# Patient Record
Sex: FEMALE | Race: WHITE | Employment: OTHER | RURAL
[De-identification: names, ages, dates, MRNs, and addresses within clinical notes are randomized per-mention and may not be internally consistent; named-entity substitution may affect disease eponyms.]

---

## 2017-02-06 ENCOUNTER — OFFICE VISIT (OUTPATIENT)
Dept: NEUROLOGY | Age: 82
End: 2017-02-06

## 2017-02-06 VITALS
HEART RATE: 83 BPM | HEIGHT: 62 IN | DIASTOLIC BLOOD PRESSURE: 80 MMHG | WEIGHT: 172 LBS | OXYGEN SATURATION: 97 % | SYSTOLIC BLOOD PRESSURE: 167 MMHG | BODY MASS INDEX: 31.65 KG/M2

## 2017-02-06 DIAGNOSIS — H40.1130 PRIMARY OPEN ANGLE GLAUCOMA OF BOTH EYES, UNSPECIFIED GLAUCOMA STAGE: ICD-10-CM

## 2017-02-06 DIAGNOSIS — R79.89 INCREASED HOMOCYSTEINE: ICD-10-CM

## 2017-02-06 DIAGNOSIS — E03.9 ACQUIRED HYPOTHYROIDISM: ICD-10-CM

## 2017-02-06 DIAGNOSIS — I63.20 CEREBROVASCULAR ACCIDENT (CVA) DUE TO STENOSIS OF PRECEREBRAL ARTERY (HCC): ICD-10-CM

## 2017-02-06 DIAGNOSIS — E78.01 FAMILIAL HYPERCHOLESTEROLEMIA: ICD-10-CM

## 2017-02-06 DIAGNOSIS — I65.23 BILATERAL CAROTID ARTERY STENOSIS: ICD-10-CM

## 2017-02-06 DIAGNOSIS — I10 ESSENTIAL HYPERTENSION: ICD-10-CM

## 2017-02-06 DIAGNOSIS — E53.8 B12 DEFICIENCY: ICD-10-CM

## 2017-02-06 DIAGNOSIS — H49.01: Primary | ICD-10-CM

## 2017-02-06 DIAGNOSIS — E11.41 CONTROLLED TYPE 2 DIABETES MELLITUS WITH DIABETIC MONONEUROPATHY, WITHOUT LONG-TERM CURRENT USE OF INSULIN (HCC): ICD-10-CM

## 2017-02-06 RX ORDER — LOSARTAN POTASSIUM 50 MG/1
TABLET ORAL DAILY
COMMUNITY

## 2017-02-06 RX ORDER — ASPIRIN 81 MG/1
TABLET ORAL DAILY
COMMUNITY

## 2017-02-06 RX ORDER — SERTRALINE HYDROCHLORIDE 50 MG/1
TABLET, FILM COATED ORAL DAILY
COMMUNITY

## 2017-02-06 RX ORDER — NYSTATIN 100000 [USP'U]/G
POWDER TOPICAL AS NEEDED
COMMUNITY

## 2017-02-06 RX ORDER — GLUCOSAMINE SULFATE 1500 MG
POWDER IN PACKET (EA) ORAL DAILY
COMMUNITY

## 2017-02-06 RX ORDER — OXYCODONE AND ACETAMINOPHEN 5; 325 MG/1; MG/1
1 TABLET ORAL
COMMUNITY

## 2017-02-06 RX ORDER — TIMOLOL MALEATE 5 MG/ML
1 SOLUTION/ DROPS OPHTHALMIC AS NEEDED
COMMUNITY

## 2017-02-06 RX ORDER — DIPHENHYDRAMINE HCL 25 MG
25 TABLET ORAL AS NEEDED
COMMUNITY

## 2017-02-06 NOTE — PATIENT INSTRUCTIONS
[address] [facility]   Statement to Patients  April 1, 2014      In an effort to ensure the large volume of patient prescription refills is processed in the most efficient and expeditious manner, we are asking our patients to assist us by calling your Pharmacy for all prescription refills, this will include also your  Mail Order Pharmacy. The pharmacy will contact our office electronically to continue the refill process. Please do not wait until the last minute to call your pharmacy. We need at least 48 hours (2days) to fill prescriptions. We also encourage you to call your pharmacy before going to  your prescription to make sure it is ready. With regard to controlled substance prescription refill requests (narcotic refills) that need to be picked up at our office, we ask your cooperation by providing us with at least 72 hours (3days) notice that you will need a refill. We will not refill narcotic prescription refill requests after 4:00pm on any weekday, Monday through Thursday, or after 2:00pm on Fridays, or on the weekends. We encourage everyone to explore another way of getting your prescription refill request processed using [employer], our patient web portal through our electronic medical record system. [employer] is an efficient and effective way to communicate your medication request directly to the office and  downloadable as an allan on your smart phone . [employer] also features a review functionality that allows you to view your medication list as well as leave messages for your physician. Are you ready to get connected? If so please review the attatched instructions or speak to any of our staff to get you set up right away! Thank you so much for your cooperation. Should you have any questions please contact our Practice Administrator.     The Physicians and Staff,  [facility]

## 2017-02-07 DIAGNOSIS — E11.41 CONTROLLED TYPE 2 DIABETES MELLITUS WITH DIABETIC MONONEUROPATHY, WITHOUT LONG-TERM CURRENT USE OF INSULIN (HCC): ICD-10-CM

## 2017-02-07 DIAGNOSIS — E03.9 ACQUIRED HYPOTHYROIDISM: ICD-10-CM

## 2017-02-12 NOTE — PROGRESS NOTES
NEUROLOGY HISTORY AND PHYSICAL    Name Gideon Miguel Age [de-identified] y.o. MRN [id number]  1935     Ashley Mejia is a [de-identified] y.o. female. HPI Comments: This is a [de-identified] y.o. right handed female with a medical vision of hypertension, hyperlipidemia and galucoma. She has been seeing double. It is binocular. It is a skewed vision. It is worse when looking far. She has not noted what makes it worse. She does not recall the onset. She is able to read small print and magazine font. There is no variation of magnitude during the day or night. She does not suffer headache. Allergies:  None    Current Outpatient Prescriptions:  losartan (COZAAR) 50 mg tablet, Take  by mouth daily. sertraline (ZOLOFT) 50 mg tablet, Take  by mouth daily. timolol (TIMOPTIC) 0.5 % ophthalmic solution, 1 Drop as needed. nystatin (MYCOSTATIN) powder, Apply  to affected area as needed. oxyCODONE-acetaminophen (PERCOCET) 5-325 mg per tablet, Take 1 Tab by mouth every four (4) hours as needed for Pain. aspirin delayed-release 81 mg tablet, Take  by mouth daily. cholecalciferol (VITAMIN D3) 1,000 unit cap, Take  by mouth daily. diphenhydrAMINE (BENADRYL) 25 mg tablet, Take 25 mg by mouth as needed for Sleep. No current facility-administered medications for this visit. Past Medical History:    Arthritis                                                     Diabetes ([address]                                                Hypertension                                                  Social History    Marital status:                   Social History Main Topics    Smoking status: Never Smoker                                                               Review of patient's family history indicates:    Parkinson's Disease            Neg Hx                    Seizures                       Neg Hx                    Huntingtons disease            Neg Hx                        Vision Change    The history is provided by the patient.  This is a new (diplopia) problem. Episode onset: More than a month ago. The problem occurs constantly. The problem has not changed since onset. The right eye is affected. The injury mechanism was none. There is no history of trauma to the eye. There is no known exposure to pink eye. She does not wear contacts. Associated symptoms include double vision and dizziness. Pertinent negatives include no blurred vision, no discharge, no photophobia, no eye redness, no tingling and no pain. Review of Systems   Constitutional: Positive for malaise/fatigue. HENT: Negative. Eyes: Positive for double vision and visual disturbance. Negative for blurred vision, photophobia, pain, discharge and redness. Respiratory: Negative. Cardiovascular: Negative. Gastrointestinal: Negative. Genitourinary: Negative. Musculoskeletal: Positive for back pain, joint pain and myalgias. Negative for falls and neck pain. Skin: Negative. Neurological: Positive for dizziness. Negative for tingling, tremors, sensory change and speech change. Endo/Heme/Allergies: Negative. Psychiatric/Behavioral: Negative for depression, hallucinations, memory loss, substance abuse and suicidal ideas. The patient is nervous/anxious and has insomnia. Physical Exam   Constitutional: She is oriented to person, place, and time. She appears well-developed. HENT:   Head: Normocephalic and atraumatic. Eyes: Conjunctivae are normal. Pupils are equal, round, and reactive to light. Difficult exam. Diplopia in most all directions except when looking down and to some degree when looking to the left. 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 Abdominal: Soft. Bowel sounds are normal.   Neurological: She is alert and oriented to person, place, and time. She has normal reflexes. She displays normal reflexes. She exhibits normal muscle tone. Gait abnormal. Coordination normal.   Skin: Skin is warm. No rash noted. No erythema. No pallor. Psychiatric: Her behavior is normal. Judgment and thought content normal. Her mood appears anxious. Cognition and memory are not impaired. Neurological Exam:  Mental Status:  alert, and oriented person, place and time   Speech: Fluent, no aphasia and no dysarthria   Cranial Nerves:   Intact visual fields. Facial sensation is normal. Facial movement is symmetric. Palate is midline. Normal sternocleidomastoid strength. Tongue is midline. Hearing is intact bilaterally. Eyes: PERRL, EOM's full, no nystagmus, no ptosis. Motor:  Full and symmetric strength and normal bulk and tone   Reflexes:   +2 and symmetric   Sensory:   symmetric, with distal sensory loss, to all modalities and poor  Proprioception and vibration distally   Gait:  Gait is llimping using a walker because of a bad knee   Tremor:   No tremor. Cerebellar:  Finger to nose was demonstrated competently. Neurovascular: no carotid bruits. No JVD     ASSESSMENT and PLAN       1. Cranial nerve III palsy, partial, right Differerntial includes myasthenia   2. Familial hypercholesterolemia LIPID PANEL   3. Controlled type 2 diabetes mellitus with diabetic mononeuropathy, without long-term current use of insulin (HCC) HEMOGLOBIN A1C WITH EAG       4. Essential hypertension    5. Primary open angle glaucoma of both eyes, unspecified glaucoma stage        6. B12 deficiency VITAMIN B12 & FOLATE   7. Increased homocysteine (HCC) HOMOCYST(E)INE, PLASMA   8. Cerebrovascular accident (CVA) due to stenosis of precerebral artery (HCC) MRI BRAIN WO CONT    2D ECHO COMPLETE ADULT (TTE) W OR WO CONTR   9. Acquired hypothyroidism TSH 3RD GENERATION   10.  Bilateral carotid artery stenosis DUPLEX CAROTID BILATERAL         60  minutes spent more than 50% spent in face to face  examination and discussion of treatment options and approach

## 2017-02-27 ENCOUNTER — TELEPHONE (OUTPATIENT)
Dept: NEUROLOGY | Age: 82
End: 2017-02-27

## 2017-02-27 NOTE — TELEPHONE ENCOUNTER
Patient's son Ananth Blackwood called to ask if labs ordered for patient required fasting. I Spoke with Linda Lyon who said yes as Lipid Panel and A1C were included in the labs. Frank Garay

## 2017-04-03 ENCOUNTER — OFFICE VISIT (OUTPATIENT)
Dept: NEUROLOGY | Age: 82
End: 2017-04-03

## 2017-04-03 VITALS
SYSTOLIC BLOOD PRESSURE: 110 MMHG | WEIGHT: 172 LBS | OXYGEN SATURATION: 98 % | DIASTOLIC BLOOD PRESSURE: 80 MMHG | HEIGHT: 62 IN | BODY MASS INDEX: 31.65 KG/M2 | HEART RATE: 91 BPM

## 2017-04-10 ENCOUNTER — TELEPHONE (OUTPATIENT)
Dept: NEUROLOGY | Age: 82
End: 2017-04-10

## 2017-04-10 DIAGNOSIS — H49.01 CRANIAL NERVE III PALSY, RIGHT: Primary | ICD-10-CM

## 2017-04-10 NOTE — TELEPHONE ENCOUNTER
Pt needs an order for an mri with and with out contrast due to her history of cancer. Unless the doctor doesn't want it that way. You can fax new order to [facility] [phone] with a lab order as well to check her creatin level. Please call.  Thanks

## 2017-04-11 NOTE — TELEPHONE ENCOUNTER
Son called to have the mri scheduled for patient at [facility] and they stated that they need an order for MRI with and without contrast due to the history of cancer. And also an order for labs to check her creatin level.

## 2017-04-13 DIAGNOSIS — N19 RENAL FAILURE: Primary | ICD-10-CM

## 2017-05-09 DIAGNOSIS — I63.20 CEREBROVASCULAR ACCIDENT (CVA) DUE TO STENOSIS OF PRECEREBRAL ARTERY (HCC): ICD-10-CM

## 2017-06-05 ENCOUNTER — TELEPHONE (OUTPATIENT)
Dept: NEUROLOGY | Age: 82
End: 2017-06-05

## 2018-12-07 ENCOUNTER — APPOINTMENT (OUTPATIENT)
Age: 83
Setting detail: DERMATOLOGY
End: 2018-12-10

## 2018-12-07 DIAGNOSIS — B37.2 CANDIDIASIS OF SKIN AND NAIL: ICD-10-CM

## 2018-12-07 DIAGNOSIS — Z71.89 OTHER SPECIFIED COUNSELING: ICD-10-CM

## 2018-12-07 DIAGNOSIS — L81.4 OTHER MELANIN HYPERPIGMENTATION: ICD-10-CM

## 2018-12-07 PROCEDURE — OTHER MIPS QUALITY: OTHER

## 2018-12-07 PROCEDURE — OTHER REASSURANCE: OTHER

## 2018-12-07 PROCEDURE — OTHER RECOMMENDATIONS: OTHER

## 2018-12-07 PROCEDURE — OTHER DIAGNOSIS COMMENT: OTHER

## 2018-12-07 PROCEDURE — OTHER COUNSELING: OTHER

## 2018-12-07 PROCEDURE — OTHER PRESCRIPTION: OTHER

## 2018-12-07 PROCEDURE — 99203 OFFICE O/P NEW LOW 30 MIN: CPT

## 2018-12-07 RX ORDER — CLOTRIMAZOLE AND BETAMETHASONE DIPROPIONATE 10; .5 MG/G; MG/G
CREAM TOPICAL
Qty: 1 | Refills: 2 | Status: ERX | COMMUNITY
Start: 2018-12-07

## 2018-12-07 RX ORDER — FLUCONAZOLE 200 MG/1
TABLET ORAL
Qty: 10 | Refills: 1 | Status: ERX | COMMUNITY
Start: 2018-12-07

## 2018-12-07 ASSESSMENT — LOCATION SIMPLE DESCRIPTION DERM
LOCATION SIMPLE: RIGHT BREAST
LOCATION SIMPLE: RIGHT WRIST
LOCATION SIMPLE: LEFT WRIST
LOCATION SIMPLE: ABDOMEN

## 2018-12-07 ASSESSMENT — LOCATION DETAILED DESCRIPTION DERM
LOCATION DETAILED: RIGHT DORSAL WRIST
LOCATION DETAILED: LEFT RIB CAGE
LOCATION DETAILED: RIGHT INFRAMAMMARY CREASE (INNER QUADRANT)
LOCATION DETAILED: LEFT DORSAL WRIST

## 2018-12-07 ASSESSMENT — LOCATION ZONE DERM
LOCATION ZONE: ARM
LOCATION ZONE: TRUNK

## 2018-12-07 NOTE — PROCEDURE: MIPS QUALITY
Name And Contact Information For Health Care Proxy: Bhavesh Ma [phone] Name And Contact Information For Health Care Proxy: Bhavesh Ma [phone]

## 2018-12-07 NOTE — PROCEDURE: RECOMMENDATIONS
Recommendations (Free Text): Zeasorb AF powder twice daily
Detail Level: Zone
Recommendation Preamble: The following recommendations were made:
Detail Level: Detailed

## 2019-12-23 ENCOUNTER — APPOINTMENT (OUTPATIENT)
Age: 84
Setting detail: DERMATOLOGY
End: 2020-01-02

## 2019-12-23 DIAGNOSIS — L23.9 ALLERGIC CONTACT DERMATITIS, UNSPECIFIED CAUSE: ICD-10-CM

## 2019-12-23 DIAGNOSIS — D485 NEOPLASM OF UNCERTAIN BEHAVIOR OF SKIN: ICD-10-CM

## 2019-12-23 DIAGNOSIS — L30.4 ERYTHEMA INTERTRIGO: ICD-10-CM

## 2019-12-23 PROBLEM — D48.5 NEOPLASM OF UNCERTAIN BEHAVIOR OF SKIN: Status: ACTIVE | Noted: 2019-12-23

## 2019-12-23 PROCEDURE — 99214 OFFICE O/P EST MOD 30 MIN: CPT | Mod: 25

## 2019-12-23 PROCEDURE — OTHER MIPS QUALITY: OTHER

## 2019-12-23 PROCEDURE — OTHER COUNSELING: OTHER

## 2019-12-23 PROCEDURE — OTHER PRESCRIPTION: OTHER

## 2019-12-23 PROCEDURE — 11102 TANGNTL BX SKIN SINGLE LES: CPT

## 2019-12-23 PROCEDURE — OTHER BIOPSY BY SHAVE METHOD: OTHER

## 2019-12-23 RX ORDER — TRIAMCINOLONE ACETONIDE 1 MG/G
OINTMENT TOPICAL
Qty: 1 | Refills: 1 | Status: ERX | COMMUNITY
Start: 2019-12-23

## 2019-12-23 ASSESSMENT — LOCATION DETAILED DESCRIPTION DERM
LOCATION DETAILED: LEFT PROXIMAL DORSAL FOREARM
LOCATION DETAILED: LEFT PROXIMAL POSTERIOR UPPER ARM
LOCATION DETAILED: RIGHT PROXIMAL POSTERIOR UPPER ARM
LOCATION DETAILED: RIGHT SUPERIOR UPPER BACK
LOCATION DETAILED: LEFT SUPERIOR UPPER BACK
LOCATION DETAILED: LEFT CENTRAL LATERAL NECK
LOCATION DETAILED: RIGHT DISTAL DORSAL FOREARM

## 2019-12-23 ASSESSMENT — LOCATION ZONE DERM
LOCATION ZONE: NECK
LOCATION ZONE: TRUNK
LOCATION ZONE: ARM

## 2019-12-23 ASSESSMENT — LOCATION SIMPLE DESCRIPTION DERM
LOCATION SIMPLE: RIGHT POSTERIOR UPPER ARM
LOCATION SIMPLE: RIGHT UPPER BACK
LOCATION SIMPLE: RIGHT FOREARM
LOCATION SIMPLE: LEFT UPPER BACK
LOCATION SIMPLE: LEFT FOREARM
LOCATION SIMPLE: LEFT POSTERIOR UPPER ARM
LOCATION SIMPLE: NECK

## 2019-12-23 NOTE — PROCEDURE: MIPS QUALITY
Name And Contact Information For Health Care Proxy: Bhavesh Ma [phone] Name And Contact Information For Health Care Proxy: Bhavesh Ma [phone]

## 2019-12-23 NOTE — PROCEDURE: BIOPSY BY SHAVE METHOD
Wound Care: Petrolatum
Billing Type: Third-Party Bill
Hide Additional Size Dimension?: No
Detail Level: Detailed
Biopsy Type: H and E
Depth Of Biopsy: dermis
Silver Nitrate Text: The wound bed was treated with silver nitrate after the biopsy was performed.
Dressing: bandage
Type Of Destruction Used: Curettage
Cryotherapy Text: The wound bed was treated with cryotherapy after the biopsy was performed.
Curettage Text: The wound bed was treated with curettage after the biopsy was performed.
Electrodesiccation Text: The wound bed was treated with electrodesiccation after the biopsy was performed.
Consent: Written consent was obtained and risks were reviewed including but not limited to scarring, infection, bleeding, scabbing, incomplete removal, nerve damage and allergy to anesthesia.
Hemostasis: Drysol
Additional Anesthesia Volume In Cc (Will Not Render If 0): 0
Anesthesia Volume In Cc (Will Not Render If 0): 0.5
Anesthesia Type: 1% lidocaine with epinephrine
Electrodesiccation And Curettage Text: The wound bed was treated with electrodesiccation and curettage after the biopsy was performed.
Notification Instructions: Patient will be notified of biopsy results. However, patient instructed to call the office if not contacted within 2 weeks.
Was A Bandage Applied: Yes
Post-Care Instructions: I reviewed with the patient in detail post-care instructions. Patient is to keep the biopsy site dry overnight, and then apply bacitracin twice daily until healed. Patient may apply hydrogen peroxide soaks to remove any crusting.
Biopsy Method: curette

## 2020-01-10 ENCOUNTER — APPOINTMENT (OUTPATIENT)
Age: 85
Setting detail: DERMATOLOGY
End: 2020-01-10

## 2020-01-10 PROBLEM — C44.41 BASAL CELL CARCINOMA OF SKIN OF SCALP AND NECK: Status: ACTIVE | Noted: 2020-01-10

## 2020-01-10 PROCEDURE — 13132 CMPLX RPR F/C/C/M/N/AX/G/H/F: CPT

## 2020-01-10 PROCEDURE — 17311 MOHS 1 STAGE H/N/HF/G: CPT

## 2020-01-10 PROCEDURE — OTHER MOHS SURGERY: OTHER

## 2020-01-10 NOTE — PROCEDURE: MOHS SURGERY
Attending Attestation (For Attendings USE Only)... Mohs Rapid Report Verbiage: The area of clinically evident tumor was marked with skin marking ink and appropriately hatched.  The initial incision was made following the Mohs approach through the skin.  The specimen was taken to the lab, divided into the necessary number of pieces, chromacoded and processed according to the Mohs protocol.  This was repeated in successive stages until a tumor free defect was achieved.

## 2020-01-22 ENCOUNTER — APPOINTMENT (OUTPATIENT)
Age: 85
Setting detail: DERMATOLOGY
End: 2020-01-25

## 2020-01-22 DIAGNOSIS — D69.2 OTHER NONTHROMBOCYTOPENIC PURPURA: ICD-10-CM

## 2020-01-22 DIAGNOSIS — L29.8 OTHER PRURITUS: ICD-10-CM

## 2020-01-22 DIAGNOSIS — L23.9 ALLERGIC CONTACT DERMATITIS, UNSPECIFIED CAUSE: ICD-10-CM

## 2020-01-22 DIAGNOSIS — L30.4 ERYTHEMA INTERTRIGO: ICD-10-CM

## 2020-01-22 DIAGNOSIS — Z48.02 ENCOUNTER FOR REMOVAL OF SUTURES: ICD-10-CM

## 2020-01-22 DIAGNOSIS — L71.8 OTHER ROSACEA: ICD-10-CM

## 2020-01-22 PROCEDURE — 99024 POSTOP FOLLOW-UP VISIT: CPT

## 2020-01-22 PROCEDURE — OTHER SUTURE REMOVAL (GLOBAL PERIOD): OTHER

## 2020-01-22 PROCEDURE — 99214 OFFICE O/P EST MOD 30 MIN: CPT

## 2020-01-22 PROCEDURE — OTHER TREATMENT REGIMEN: OTHER

## 2020-01-22 PROCEDURE — OTHER MIPS QUALITY: OTHER

## 2020-01-22 PROCEDURE — OTHER PRESCRIPTION: OTHER

## 2020-01-22 PROCEDURE — OTHER COUNSELING: OTHER

## 2020-01-22 RX ORDER — METRONIDAZOLE 7.5 MG/G
CREAM TOPICAL
Qty: 1 | Refills: 3 | Status: ERX | COMMUNITY
Start: 2020-01-22

## 2020-01-22 ASSESSMENT — LOCATION SIMPLE DESCRIPTION DERM
LOCATION SIMPLE: RIGHT POSTERIOR UPPER ARM
LOCATION SIMPLE: NECK
LOCATION SIMPLE: RIGHT FOREARM
LOCATION SIMPLE: LEFT POSTERIOR UPPER ARM
LOCATION SIMPLE: LEFT FOREARM
LOCATION SIMPLE: LEFT UPPER BACK
LOCATION SIMPLE: RIGHT UPPER BACK

## 2020-01-22 ASSESSMENT — LOCATION DETAILED DESCRIPTION DERM
LOCATION DETAILED: RIGHT DISTAL DORSAL FOREARM
LOCATION DETAILED: RIGHT PROXIMAL DORSAL FOREARM
LOCATION DETAILED: LEFT PROXIMAL POSTERIOR UPPER ARM
LOCATION DETAILED: LEFT SUPERIOR UPPER BACK
LOCATION DETAILED: LEFT DISTAL DORSAL FOREARM
LOCATION DETAILED: LEFT PROXIMAL DORSAL FOREARM
LOCATION DETAILED: RIGHT SUPERIOR UPPER BACK
LOCATION DETAILED: LEFT CENTRAL LATERAL NECK
LOCATION DETAILED: RIGHT PROXIMAL POSTERIOR UPPER ARM

## 2020-01-22 ASSESSMENT — LOCATION ZONE DERM
LOCATION ZONE: TRUNK
LOCATION ZONE: ARM
LOCATION ZONE: NECK

## 2020-01-22 NOTE — PROCEDURE: SUTURE REMOVAL (GLOBAL PERIOD)
Detail Level: Simple
Add 64008 Cpt? (Important Note: In 2017 The Use Of 25507 Is Being Tracked By Cms To Determine Future Global Period Reimbursement For Global Periods): yes

## 2020-01-22 NOTE — PROCEDURE: MIPS QUALITY
Name And Contact Information For Health Care Proxy: Bhavesh Ma [phone] Name And Contact Information For Health Care Proxy: Bhavesh Ma [phone]

## 2020-01-22 NOTE — PROCEDURE: TREATMENT REGIMEN
Modify Regimen: Monday thru Thursday tac and only Sarna itch Friday thru Sunday
Detail Level: Detailed
Otc Regimen: Cotton strips to use under breasts and cool hair dryer
Modify Regimen: Ketoconazole and hydrocortisone mix 2x a day Friday thru Sunday on buttocks rash

## 2020-02-18 ENCOUNTER — APPOINTMENT (OUTPATIENT)
Dept: CT IMAGING | Age: 85
DRG: 813 | End: 2020-02-18
Attending: EMERGENCY MEDICINE
Payer: MEDICARE

## 2020-02-18 ENCOUNTER — HOSPITAL ENCOUNTER (INPATIENT)
Age: 85
LOS: 5 days | Discharge: HOME | DRG: 813 | End: 2020-02-23
Attending: EMERGENCY MEDICINE | Admitting: INTERNAL MEDICINE
Payer: MEDICARE

## 2020-02-18 ENCOUNTER — APPOINTMENT (OUTPATIENT)
Dept: GENERAL RADIOLOGY | Age: 85
DRG: 813 | End: 2020-02-18
Attending: EMERGENCY MEDICINE
Payer: MEDICARE

## 2020-02-18 DIAGNOSIS — N39.0 URINARY TRACT INFECTION WITHOUT HEMATURIA, SITE UNSPECIFIED: Primary | ICD-10-CM

## 2020-02-18 DIAGNOSIS — D69.6 THROMBOCYTOPENIA (HCC): ICD-10-CM

## 2020-02-18 DIAGNOSIS — D69.3 CHRONIC ITP (IDIOPATHIC THROMBOCYTOPENIA) (HCC): ICD-10-CM

## 2020-02-18 PROBLEM — G93.40 ACUTE ENCEPHALOPATHY: Status: ACTIVE | Noted: 2020-02-18

## 2020-02-18 PROBLEM — A41.9 SEPSIS (HCC): Status: ACTIVE | Noted: 2020-02-18

## 2020-02-18 LAB
ABO + RH BLD: NORMAL
ALBUMIN SERPL-MCNC: 2.9 G/DL (ref 3.5–5)
ALBUMIN/GLOB SERPL: 0.7 {RATIO} (ref 1.1–2.2)
ALP SERPL-CCNC: 69 U/L (ref 45–117)
ALT SERPL-CCNC: 10 U/L (ref 12–78)
ANION GAP SERPL CALC-SCNC: 7 MMOL/L (ref 5–15)
APPEARANCE UR: ABNORMAL
AST SERPL-CCNC: 13 U/L (ref 15–37)
BACTERIA URNS QL MICRO: ABNORMAL /HPF
BASOPHILS # BLD: 0.1 K/UL (ref 0–0.1)
BASOPHILS NFR BLD: 1 % (ref 0–1)
BILIRUB SERPL-MCNC: 0.6 MG/DL (ref 0.2–1)
BILIRUB UR QL: NEGATIVE
BLOOD BANK CMNT PATIENT-IMP: NORMAL
BUN SERPL-MCNC: 27 MG/DL (ref 6–20)
BUN/CREAT SERPL: 17 (ref 12–20)
CALCIUM SERPL-MCNC: 9.1 MG/DL (ref 8.5–10.1)
CHLORIDE SERPL-SCNC: 105 MMOL/L (ref 97–108)
CO2 SERPL-SCNC: 24 MMOL/L (ref 21–32)
COLOR UR: ABNORMAL
CREAT SERPL-MCNC: 1.58 MG/DL (ref 0.55–1.02)
DIFFERENTIAL METHOD BLD: ABNORMAL
EOSINOPHIL # BLD: 0.4 K/UL (ref 0–0.4)
EOSINOPHIL NFR BLD: 3 % (ref 0–7)
EPITH CASTS URNS QL MICRO: ABNORMAL /LPF
ERYTHROCYTE [DISTWIDTH] IN BLOOD BY AUTOMATED COUNT: 14 % (ref 11.5–14.5)
GLOBULIN SER CALC-MCNC: 4.2 G/DL (ref 2–4)
GLUCOSE SERPL-MCNC: 109 MG/DL (ref 65–100)
GLUCOSE UR STRIP.AUTO-MCNC: NEGATIVE MG/DL
HCT VFR BLD AUTO: 36.1 % (ref 35–47)
HGB BLD-MCNC: 11.4 G/DL (ref 11.5–16)
HGB UR QL STRIP: ABNORMAL
IMM GRANULOCYTES # BLD AUTO: 0.1 K/UL (ref 0–0.04)
IMM GRANULOCYTES NFR BLD AUTO: 1 % (ref 0–0.5)
INR PPP: 1.1 (ref 0.9–1.1)
KETONES UR QL STRIP.AUTO: NEGATIVE MG/DL
LACTATE SERPL-SCNC: 1 MMOL/L (ref 0.4–2)
LEUKOCYTE ESTERASE UR QL STRIP.AUTO: ABNORMAL
LIPASE SERPL-CCNC: 81 U/L (ref 73–393)
LYMPHOCYTES # BLD: 4 K/UL (ref 0.8–3.5)
LYMPHOCYTES NFR BLD: 30 % (ref 12–49)
MCH RBC QN AUTO: 25.6 PG (ref 26–34)
MCHC RBC AUTO-ENTMCNC: 31.6 G/DL (ref 30–36.5)
MCV RBC AUTO: 81.1 FL (ref 80–99)
MONOCYTES # BLD: 0.9 K/UL (ref 0–1)
MONOCYTES NFR BLD: 7 % (ref 5–13)
MUCOUS THREADS URNS QL MICRO: ABNORMAL /LPF
NEUTS SEG # BLD: 7.9 K/UL (ref 1.8–8)
NEUTS SEG NFR BLD: 58 % (ref 32–75)
NITRITE UR QL STRIP.AUTO: POSITIVE
NRBC # BLD: 0 K/UL (ref 0–0.01)
NRBC BLD-RTO: 0 PER 100 WBC
PH UR STRIP: 5.5 [PH] (ref 5–8)
PLATELET # BLD AUTO: 12 K/UL (ref 150–400)
PMV BLD AUTO: ABNORMAL FL (ref 8.9–12.9)
POTASSIUM SERPL-SCNC: 4 MMOL/L (ref 3.5–5.1)
PROCALCITONIN SERPL-MCNC: <0.05 NG/ML
PROT SERPL-MCNC: 7.1 G/DL (ref 6.4–8.2)
PROT UR STRIP-MCNC: 100 MG/DL
PROTHROMBIN TIME: 11.1 SEC (ref 9–11.1)
RBC # BLD AUTO: 4.45 M/UL (ref 3.8–5.2)
RBC #/AREA URNS HPF: ABNORMAL /HPF
RBC MORPH BLD: ABNORMAL
SODIUM SERPL-SCNC: 136 MMOL/L (ref 136–145)
SP GR UR REFRACTOMETRY: 1.02 (ref 1–1.03)
TROPONIN I SERPL-MCNC: <0.05 NG/ML
TSH SERPL DL<=0.05 MIU/L-ACNC: 0.05 UIU/ML (ref 0.36–3.74)
UROBILINOGEN UR QL STRIP.AUTO: 0.2 EU/DL (ref 0.2–1)
WBC # BLD AUTO: 13.4 K/UL (ref 3.6–11)
WBC URNS QL MICRO: >100 /HPF

## 2020-02-18 PROCEDURE — 74011000258 HC RX REV CODE- 258: Performed by: INTERNAL MEDICINE

## 2020-02-18 PROCEDURE — 71046 X-RAY EXAM CHEST 2 VIEWS: CPT

## 2020-02-18 PROCEDURE — 74011250636 HC RX REV CODE- 250/636: Performed by: INTERNAL MEDICINE

## 2020-02-18 PROCEDURE — 81001 URINALYSIS AUTO W/SCOPE: CPT

## 2020-02-18 PROCEDURE — 74176 CT ABD & PELVIS W/O CONTRAST: CPT

## 2020-02-18 PROCEDURE — 84484 ASSAY OF TROPONIN QUANT: CPT

## 2020-02-18 PROCEDURE — 84443 ASSAY THYROID STIM HORMONE: CPT

## 2020-02-18 PROCEDURE — 85025 COMPLETE CBC W/AUTO DIFF WBC: CPT

## 2020-02-18 PROCEDURE — 51701 INSERT BLADDER CATHETER: CPT

## 2020-02-18 PROCEDURE — 86900 BLOOD TYPING SEROLOGIC ABO: CPT

## 2020-02-18 PROCEDURE — 36415 COLL VENOUS BLD VENIPUNCTURE: CPT

## 2020-02-18 PROCEDURE — 74011250636 HC RX REV CODE- 250/636: Performed by: EMERGENCY MEDICINE

## 2020-02-18 PROCEDURE — 87040 BLOOD CULTURE FOR BACTERIA: CPT

## 2020-02-18 PROCEDURE — 99285 EMERGENCY DEPT VISIT HI MDM: CPT

## 2020-02-18 PROCEDURE — 80053 COMPREHEN METABOLIC PANEL: CPT

## 2020-02-18 PROCEDURE — 65270000029 HC RM PRIVATE

## 2020-02-18 PROCEDURE — 83605 ASSAY OF LACTIC ACID: CPT

## 2020-02-18 PROCEDURE — 70450 CT HEAD/BRAIN W/O DYE: CPT

## 2020-02-18 PROCEDURE — 85610 PROTHROMBIN TIME: CPT

## 2020-02-18 PROCEDURE — 83690 ASSAY OF LIPASE: CPT

## 2020-02-18 PROCEDURE — 84145 PROCALCITONIN (PCT): CPT

## 2020-02-18 RX ORDER — SODIUM CHLORIDE 0.9 % (FLUSH) 0.9 %
5-40 SYRINGE (ML) INJECTION EVERY 8 HOURS
Status: DISCONTINUED | OUTPATIENT
Start: 2020-02-18 | End: 2020-02-23 | Stop reason: HOSPADM

## 2020-02-18 RX ORDER — DOCUSATE SODIUM 100 MG/1
100 CAPSULE, LIQUID FILLED ORAL 2 TIMES DAILY
Status: DISCONTINUED | OUTPATIENT
Start: 2020-02-19 | End: 2020-02-23 | Stop reason: HOSPADM

## 2020-02-18 RX ORDER — MELATONIN
1000 DAILY
Status: DISCONTINUED | OUTPATIENT
Start: 2020-02-19 | End: 2020-02-23 | Stop reason: HOSPADM

## 2020-02-18 RX ORDER — SODIUM CHLORIDE 9 MG/ML
100 INJECTION, SOLUTION INTRAVENOUS CONTINUOUS
Status: DISPENSED | OUTPATIENT
Start: 2020-02-18 | End: 2020-02-19

## 2020-02-18 RX ORDER — ONDANSETRON 2 MG/ML
4 INJECTION INTRAMUSCULAR; INTRAVENOUS
Status: DISCONTINUED | OUTPATIENT
Start: 2020-02-18 | End: 2020-02-23 | Stop reason: HOSPADM

## 2020-02-18 RX ORDER — SODIUM CHLORIDE 0.9 % (FLUSH) 0.9 %
5-40 SYRINGE (ML) INJECTION AS NEEDED
Status: DISCONTINUED | OUTPATIENT
Start: 2020-02-18 | End: 2020-02-23 | Stop reason: HOSPADM

## 2020-02-18 RX ORDER — ACETAMINOPHEN 325 MG/1
650 TABLET ORAL
Status: DISCONTINUED | OUTPATIENT
Start: 2020-02-18 | End: 2020-02-23 | Stop reason: HOSPADM

## 2020-02-18 RX ADMIN — SODIUM CHLORIDE 500 ML: 900 INJECTION, SOLUTION INTRAVENOUS at 19:34

## 2020-02-18 RX ADMIN — CEFTRIAXONE SODIUM 1 G: 1 INJECTION, POWDER, FOR SOLUTION INTRAMUSCULAR; INTRAVENOUS at 22:21

## 2020-02-19 LAB
ANION GAP SERPL CALC-SCNC: 6 MMOL/L (ref 5–15)
BUN SERPL-MCNC: 22 MG/DL (ref 6–20)
BUN/CREAT SERPL: 16 (ref 12–20)
CALCIUM SERPL-MCNC: 9 MG/DL (ref 8.5–10.1)
CHLORIDE SERPL-SCNC: 108 MMOL/L (ref 97–108)
CO2 SERPL-SCNC: 24 MMOL/L (ref 21–32)
CREAT SERPL-MCNC: 1.38 MG/DL (ref 0.55–1.02)
ERYTHROCYTE [DISTWIDTH] IN BLOOD BY AUTOMATED COUNT: 14 % (ref 11.5–14.5)
FIBRINOGEN PPP-MCNC: 431 MG/DL (ref 200–475)
GLUCOSE SERPL-MCNC: 110 MG/DL (ref 65–100)
HAPTOGLOB SERPL-MCNC: 212 MG/DL (ref 30–200)
HCT VFR BLD AUTO: 34.2 % (ref 35–47)
HGB BLD-MCNC: 10.8 G/DL (ref 11.5–16)
LDH SERPL L TO P-CCNC: 128 U/L (ref 81–246)
MCH RBC QN AUTO: 26.2 PG (ref 26–34)
MCHC RBC AUTO-ENTMCNC: 31.6 G/DL (ref 30–36.5)
MCV RBC AUTO: 83 FL (ref 80–99)
NRBC # BLD: 0 K/UL (ref 0–0.01)
NRBC BLD-RTO: 0 PER 100 WBC
PERIPHERAL SMEAR,PSM: NORMAL
PLATELET # BLD AUTO: 12 K/UL (ref 150–400)
PMV BLD AUTO: 11 FL (ref 8.9–12.9)
POTASSIUM SERPL-SCNC: 3.8 MMOL/L (ref 3.5–5.1)
RBC # BLD AUTO: 4.12 M/UL (ref 3.8–5.2)
SODIUM SERPL-SCNC: 138 MMOL/L (ref 136–145)
WBC # BLD AUTO: 9.9 K/UL (ref 3.6–11)

## 2020-02-19 PROCEDURE — 85384 FIBRINOGEN ACTIVITY: CPT

## 2020-02-19 PROCEDURE — 97530 THERAPEUTIC ACTIVITIES: CPT | Performed by: PHYSICAL THERAPIST

## 2020-02-19 PROCEDURE — 74011250637 HC RX REV CODE- 250/637: Performed by: INTERNAL MEDICINE

## 2020-02-19 PROCEDURE — 80048 BASIC METABOLIC PNL TOTAL CA: CPT

## 2020-02-19 PROCEDURE — 74011250636 HC RX REV CODE- 250/636: Performed by: INTERNAL MEDICINE

## 2020-02-19 PROCEDURE — 83010 ASSAY OF HAPTOGLOBIN QUANT: CPT

## 2020-02-19 PROCEDURE — 83615 LACTATE (LD) (LDH) ENZYME: CPT

## 2020-02-19 PROCEDURE — 36415 COLL VENOUS BLD VENIPUNCTURE: CPT

## 2020-02-19 PROCEDURE — 85027 COMPLETE CBC AUTOMATED: CPT

## 2020-02-19 PROCEDURE — 97161 PT EVAL LOW COMPLEX 20 MIN: CPT | Performed by: PHYSICAL THERAPIST

## 2020-02-19 PROCEDURE — 65270000029 HC RM PRIVATE

## 2020-02-19 RX ORDER — SODIUM CHLORIDE 9 MG/ML
250 INJECTION, SOLUTION INTRAVENOUS AS NEEDED
Status: DISCONTINUED | OUTPATIENT
Start: 2020-02-19 | End: 2020-02-23 | Stop reason: HOSPADM

## 2020-02-19 RX ADMIN — DOCUSATE SODIUM 100 MG: 100 CAPSULE, LIQUID FILLED ORAL at 17:11

## 2020-02-19 RX ADMIN — VITAMIN D, TAB 1000IU (100/BT) 1 TABLET: 25 TAB at 10:06

## 2020-02-19 RX ADMIN — SODIUM CHLORIDE 100 ML/HR: 900 INJECTION, SOLUTION INTRAVENOUS at 01:08

## 2020-02-19 RX ADMIN — DOCUSATE SODIUM 100 MG: 100 CAPSULE, LIQUID FILLED ORAL at 10:06

## 2020-02-19 RX ADMIN — Medication 10 ML: at 06:37

## 2020-02-19 RX ADMIN — Medication 10 ML: at 22:00

## 2020-02-19 RX ADMIN — Medication 10 ML: at 00:07

## 2020-02-19 RX ADMIN — Medication 10 ML: at 14:34

## 2020-02-19 NOTE — ED PROVIDER NOTES
EMERGENCY DEPARTMENT HISTORY AND PHYSICAL EXAM      Date: 2/18/2020  Patient Name: Darien Marcus    History of Presenting Illness     Chief Complaint   Patient presents with    Abnormal Lab Results     Pt sent for low platelet count       History Provided By: Patient    HPI: Darien Marcus, 81 y.o. female with PMHx significant for arthritis, diabetes, hypertension, presents to the ED with cc of moderate severe confusion, lethargy, abdominal distention, and low platelet counts. Family reports patient symptoms have been progressive over the last 3 to 4 weeks. She was seen in outside primary care doctor within the last 1 week and they were called today to note that the patient's platelet count was 35,760. They report that her abdominal girth is not increased over the last 2 weeks. They report that she is very sleepy throughout most of the day and not acting herself. They do not report there is any confirmed history of dementia but they believe she may be early onset. She is taken care of by her son at home who reports she is unable to care for her. There are no other associated symptoms. No other exacerbating or militating factors. There are no other complaints, changes, or physical findings at this time. PCP: Adriana Caro MD    No current facility-administered medications on file prior to encounter. Current Outpatient Medications on File Prior to Encounter   Medication Sig Dispense Refill    losartan (COZAAR) 50 mg tablet Take  by mouth daily.  sertraline (ZOLOFT) 50 mg tablet Take  by mouth daily.  timolol (TIMOPTIC) 0.5 % ophthalmic solution 1 Drop as needed.  nystatin (MYCOSTATIN) powder Apply  to affected area as needed.  oxyCODONE-acetaminophen (PERCOCET) 5-325 mg per tablet Take 1 Tab by mouth every four (4) hours as needed for Pain.  aspirin delayed-release 81 mg tablet Take  by mouth daily.       cholecalciferol (VITAMIN D3) 1,000 unit cap Take  by mouth daily.  diphenhydrAMINE (BENADRYL) 25 mg tablet Take 25 mg by mouth as needed for Sleep. Past History     Past Medical History:  Past Medical History:   Diagnosis Date    Arthritis     Diabetes ([address]     Hypertension        Past Surgical History:  Past Surgical History:   Procedure Laterality Date    HX ORTHOPAEDIC         Family History:  Family History   Problem Relation Age of Onset    Parkinson's Disease Neg Hx     Seizures Neg Hx     Huntingtons disease Neg Hx        Social History:  Social History     Tobacco Use    Smoking status: Never Smoker   Substance Use Topics    Alcohol use: No    Drug use: No       Allergies: Allergies   Allergen Reactions    Sulfa (Sulfonamide Antibiotics) Hives         Review of Systems   Review of Systems   Unable to perform ROS: Mental status change       Physical Exam   Physical Exam  Vitals signs and nursing note reviewed. Constitutional:       Appearance: She is well-developed. She is ill-appearing. She is not toxic-appearing or diaphoretic. Comments: Patient appears confused and is not oriented to person, place, or thing. HENT:      Head: Normocephalic and atraumatic. Mouth/Throat:      Pharynx: No oropharyngeal exudate. Eyes:      Conjunctiva/sclera: Conjunctivae normal.      Pupils: Pupils are equal, round, and reactive to light. Neck:      Musculoskeletal: Normal range of motion. Cardiovascular:      Rate and Rhythm: Normal rate and regular rhythm. Heart sounds: No murmur. Pulmonary:      Effort: Pulmonary effort is normal. No respiratory distress. Breath sounds: Normal breath sounds. No wheezing. Abdominal:      General: Bowel sounds are normal. There is no distension. Palpations: Abdomen is soft. Tenderness: There is no abdominal tenderness. Musculoskeletal: Normal range of motion. General: No deformity. Skin:     General: Skin is warm and dry. Findings: No rash.    Neurological: General: No focal deficit present. Mental Status: She is oriented to person, place, and time. She is lethargic. GCS: GCS eye subscore is 4. GCS verbal subscore is 4. GCS motor subscore is 6. Cranial Nerves: Cranial nerves are intact. Sensory: Sensation is intact. Motor: Motor function is intact. Coordination: Coordination is intact. Coordination normal.   Psychiatric:         Behavior: Behavior normal.         Diagnostic Study Results     Labs -     Recent Results (from the past 24 hour(s))   METABOLIC PANEL, COMPREHENSIVE    Collection Time: 02/18/20  6:48 PM   Result Value Ref Range    Sodium 136 136 - 145 mmol/L    Potassium 4.0 3.5 - 5.1 mmol/L    Chloride 105 97 - 108 mmol/L    CO2 24 21 - 32 mmol/L    Anion gap 7 5 - 15 mmol/L    Glucose 109 (H) 65 - 100 mg/dL    BUN 27 (H) 6 - 20 MG/DL    Creatinine 1.58 (H) 0.55 - 1.02 MG/DL    BUN/Creatinine ratio 17 12 - 20      GFR est AA 38 (L) >60 ml/min/1.73m2    GFR est non-AA 31 (L) >60 ml/min/1.73m2    Calcium 9.1 8.5 - 10.1 MG/DL    Bilirubin, total 0.6 0.2 - 1.0 MG/DL    ALT (SGPT) 10 (L) 12 - 78 U/L    AST (SGOT) 13 (L) 15 - 37 U/L    Alk.  phosphatase 69 45 - 117 U/L    Protein, total 7.1 6.4 - 8.2 g/dL    Albumin 2.9 (L) 3.5 - 5.0 g/dL    Globulin 4.2 (H) 2.0 - 4.0 g/dL    A-G Ratio 0.7 (L) 1.1 - 2.2     TROPONIN I    Collection Time: 02/18/20  6:48 PM   Result Value Ref Range    Troponin-I, Qt. <0.05 <0.05 ng/mL   CBC WITH AUTOMATED DIFF    Collection Time: 02/18/20  6:48 PM   Result Value Ref Range    WBC 13.4 (H) 3.6 - 11.0 K/uL    RBC 4.45 3.80 - 5.20 M/uL    HGB 11.4 (L) 11.5 - 16.0 g/dL    HCT 36.1 35.0 - 47.0 %    MCV 81.1 80.0 - 99.0 FL    MCH 25.6 (L) 26.0 - 34.0 PG    MCHC 31.6 30.0 - 36.5 g/dL    RDW 14.0 11.5 - 14.5 %    PLATELET 12 (LL) 167 - 400 K/uL    MPV ABNORMAL 8.9 - 12.9 FL    NRBC 0.0 0  WBC    ABSOLUTE NRBC 0.00 0.00 - 0.01 K/uL    NEUTROPHILS 58 32 - 75 %    LYMPHOCYTES 30 12 - 49 %    MONOCYTES 7 5 - 13 %    EOSINOPHILS 3 0 - 7 %    BASOPHILS 1 0 - 1 %    IMMATURE GRANULOCYTES 1 (H) 0.0 - 0.5 %    ABS. NEUTROPHILS 7.9 1.8 - 8.0 K/UL    ABS. LYMPHOCYTES 4.0 (H) 0.8 - 3.5 K/UL    ABS. MONOCYTES 0.9 0.0 - 1.0 K/UL    ABS. EOSINOPHILS 0.4 0.0 - 0.4 K/UL    ABS. BASOPHILS 0.1 0.0 - 0.1 K/UL    ABS. IMM. GRANS. 0.1 (H) 0.00 - 0.04 K/UL    DF AUTOMATED      RBC COMMENTS GUY CELLS  PRESENT       LIPASE    Collection Time: 02/18/20  6:48 PM   Result Value Ref Range    Lipase 81 73 - 393 U/L   PROCALCITONIN    Collection Time: 02/18/20  6:48 PM   Result Value Ref Range    Procalcitonin <0.05 ng/mL   LACTIC ACID    Collection Time: 02/18/20  6:49 PM   Result Value Ref Range    Lactic acid 1.0 0.4 - 2.0 MMOL/L   PROTHROMBIN TIME + INR    Collection Time: 02/18/20  6:49 PM   Result Value Ref Range    INR 1.1 0.9 - 1.1      Prothrombin time 11.1 9.0 - 11.1 sec   BLOOD TYPE, (ABO+RH)    Collection Time: 02/18/20  6:49 PM   Result Value Ref Range    ABO/Rh(D) O POSITIVE     Comment SAMPLE NOT USABLE FOR CROSSMATCH    TSH 3RD GENERATION    Collection Time: 02/18/20  6:49 PM   Result Value Ref Range    TSH 0.05 (L) 0.36 - 3.74 uIU/mL   URINALYSIS W/ RFLX MICROSCOPIC    Collection Time: 02/18/20  7:34 PM   Result Value Ref Range    Color YELLOW/STRAW      Appearance TURBID (A) CLEAR      Specific gravity 1.018 1.003 - 1.030      pH (UA) 5.5 5.0 - 8.0      Protein 100 (A) NEG mg/dL    Glucose NEGATIVE  NEG mg/dL    Ketone NEGATIVE  NEG mg/dL    Bilirubin NEGATIVE  NEG      Blood MODERATE (A) NEG      Urobilinogen 0.2 0.2 - 1.0 EU/dL    Nitrites POSITIVE (A) NEG      Leukocyte Esterase LARGE (A) NEG      WBC >100 /hpf    RBC 0-5 /hpf    Epithelial cells MODERATE /lpf    Bacteria 2+ /hpf    Mucus TRACE /lpf       Radiologic Studies -   CT HEAD WO CONT   Final Result   IMPRESSION:    No acute intracranial process. Imaging findings consistent with severe chronic microvascular ischemic change.    There is a moderate to severe degree of cerebral atrophy. CT ABD PELV WO CONT   Final Result   IMPRESSION:   No evidence of acute process. Incidentals as above. XR CHEST PA LAT   Final Result   Impression:      1. Mild cardiomegaly. 2. Small left pleural effusion. CT Results  (Last 48 hours)               02/18/20 2034  CT HEAD WO CONT Final result    Impression:  IMPRESSION:    No acute intracranial process. Imaging findings consistent with severe chronic microvascular ischemic change. There is a moderate to severe degree of cerebral atrophy. Narrative:  CLINICAL HISTORY: Altered mental status, dizziness       INDICATION: Altered mental status, dizziness       COMPARISON: None   CT dose reduction was achieved through use of a standardized protocol tailored   for this examination and automatic exposure control for dose modulation. TECHNIQUE: Serial axial images with a collimation of 5 mm were obtained from the   skull base through the vertex     FINDINGS:    There is sulcal and ventricular prominence. Confluent periventricular and   scattered foci of hypodensity in the cerebral white matter. There is no evidence   of an acute infarction, hemorrhage, or mass-effect. There is no evidence of   midline shift or hydrocephalus. Posterior fossa structures are unremarkable. No   extra-axial collections are seen. Mastoid air cells are well pneumatized and clear. Left maxillary sinus disease   is moderate to severe. A full delineation and the left MCA territory related to remote infarction. Small remote lacunar infarctions in the basal ganglia on the right and on the   left.           02/18/20 2034  CT ABD PELV WO CONT Final result    Impression:  IMPRESSION:   No evidence of acute process. Incidentals as above. Narrative:  EXAM: CT ABD PELV WO CONT       INDICATION: abd distention, AMS       COMPARISON: None       CONTRAST:  None.        TECHNIQUE:    Thin axial images were obtained through the abdomen and pelvis. Coronal and   sagittal reconstructions were generated. Oral contrast was not administered. CT   dose reduction was achieved through use of a standardized protocol tailored for   this examination and automatic exposure control for dose modulation. The absence of intravenous contrast material reduces the sensitivity for   evaluation of the solid parenchymal organs of the abdomen. FINDINGS:    LUNG BASES: Clear. INCIDENTALLY IMAGED HEART AND MEDIASTINUM: Coronary atherosclerotic   calcifications are present. LIVER: There is a 4.0 cm cyst in the right hepatic lobe. No biliary dilatation. GALLBLADDER: Unremarkable. SPLEEN: No mass. PANCREAS: No mass or ductal dilatation. ADRENALS: Unremarkable. KIDNEYS/URETERS: There is a 2.2 centers cyst in the anterior mid right kidney. There is a 2.0 cm cyst in the mid right kidney. There is a 1.9 cm cyst in the   inferior pole the right kidney. There was a 1.8 cm cyst in the posterior left   kidney. There is a 1.0 cm cyst in the anterior left kidney. Several small   lesions in the inferior pole left kidney are too small to fully characterize,   but likely represent small cysts as well. No hydronephrosis. No nephrolithiasis. STOMACH: Unremarkable. SMALL BOWEL: No dilatation or wall thickening. COLON: No dilatation or wall thickening. Several diverticula are present. Status   post right hemicolectomy. APPENDIX: Surgically absent. PERITONEUM: No ascites or pneumoperitoneum. RETROPERITONEUM: No lymphadenopathy or aortic aneurysm. REPRODUCTIVE ORGANS: Status post hysterectomy. URINARY BLADDER: No mass or calculus. BONES: No destructive bone lesion. There is a chronic appearing moderate   compression deformity of L3.   ADDITIONAL COMMENTS: N/A               CXR Results  (Last 48 hours)               02/18/20 1812  XR CHEST PA LAT Final result    Impression:  Impression:       1. Mild cardiomegaly.    2. Small left pleural effusion. Narrative:  Chest PA and lateral       History: Weakness       Comparison: None       Findings: The lungs are well expanded. No focal consolidation, or   pneumothorax. There is a mild left pleural effusion. The heart is mildly   enlarged. The patient is status post reverse right total shoulder arthroplasty. The bones are osteopenic. Mild multilevel spondylosis in the spine. Medical Decision Making   I am the first provider for this patient. I reviewed the vital signs, available nursing notes, past medical history, past surgical history, family history and social history. Vital Signs-Reviewed the patient's vital signs. Patient Vitals for the past 24 hrs:   Temp Pulse Resp BP SpO2   02/18/20 2139     95 %   02/18/20 2130  86  141/61 95 %   02/18/20 2115  85  147/61 95 %   02/18/20 2100  85  146/56 98 %   02/18/20 1800    121/45 98 %   02/18/20 1701 97.9 °F (36.6 °C) 94 16 134/58 100 %       Pulse Oximetry Analysis -100 % on room air    Cardiac Monitor:   Rate: 94 bpm  Rhythm: Normal Sinus Rhythm        Records Reviewed: Nursing Notes and Old Medical Records    Differential Diagnosis:    Patient presenting with altered mental status. Pt has stable vitals and POC glucose was checked immediately upon arrival. DDx: medication toxicity, infection, anemia, electrolyte/metabolic anomoly, hypercapnea, stroke/bleed/mass, dehydration, illicit drug intoxication. Will obtain labwork, UA, EKG and CT imaging of the head, chest xray. Will consider adding toxicologic workup if history unclear or warrants further investigation of toxic source. Will continue to monitor and reassess for admission. Provider Notes (Medical Decision Making):   Patient seen and evaluated. Patient found to have findings consistent with acute urinary tract infection with leukocytosis. No evidence of severe sepsis or septic shock.   Noted severely depressed platelet count without any evidence of active bleeding. Consulted hematology (Dr. Stacia Kamara) who did not think findings were consistent with TTP based on her review of the available results. She recommended ordering haptoglobin, LDH, and fibrinogen levels. Hematology consult in the morning. No need for emergent transfer at this time. Patient mated for further management. ED Course:     Initial assessment performed. The patients presenting problems have been discussed, and they are in agreement with the care plan formulated and outlined with them. I have encouraged them to ask questions as they arise throughout their visit. Critical Care Time:     None    Disposition:  Admit Note:  9:41 PM  Pt is being admitted by hospitalist. The results of their tests and reason(s) for their admission have been discussed with pt and/or available family. They convey agreement and understanding for the need to be admitted and for admission diagnosis. PLAN:  1. Current Discharge Medication List        2. Follow-up Information    None       Return to ED if worse     Diagnosis     Clinical Impression:   1. Urinary tract infection without hematuria, site unspecified    2.  Thrombocytopenia ([address]

## 2020-02-19 NOTE — PROGRESS NOTES
Bedside and Verbal shift change report given to Sy Mackey RN (oncoming nurse) by Robert Walter RN (offgoing nurse). Report included the following information SBAR, Kardex, Intake/Output and MAR.

## 2020-02-19 NOTE — ACP (ADVANCE CARE PLANNING)
Advance Care Planning Note      NAME: Jerrica Chang   :  1935   MRN:  [id number]     Date/Time:  2020 10:08 PM    Active Diagnoses:  Hospital Problems  Date Reviewed: 2017          Codes Class Noted POA    Thrombocytopenia ([facility] [address] ICD-10-CM: D69.6  ICD-9-CM: 287.5  2020 Unknown        Sepsis ([facility] [address] ICD-10-CM: A41.9  ICD-9-CM: 038.9, 995.91  2020 Unknown        Acute encephalopathy ICD-10-CM: G93.40  ICD-9-CM: 348.30  2020 Unknown        Complicated UTI (urinary tract infection) ICD-10-CM: N39.0  ICD-9-CM: 599.0  2020 Unknown              These active diagnoses are of sufficient risk that focused discussion on advance care planning is indicated in order to allow the patient to thoughtfully consider personal goals of care, and if situations arise that prevent the ability to personally give input, to ensure appropriate representation of their personal desires for different levels and aggressiveness of care. Discussion:   Code status addressed and wants to be a DNR / DNI. Patient wants central line and vasopressors if needed. Patient would also want a feeding tube, if needed, for nutritional support. Patient  would like to assign [employer]  as the surrogate decision maker. Persons present and participating in discussion: Kinza Woody MD,       Time Spent:   Total time spent face-to-face in education and discussion:   18  minutes.          Lion Springer MD   Hospitalist

## 2020-02-19 NOTE — PROGRESS NOTES
Problem: Mobility Impaired (Adult and Pediatric)  Goal: *Acute Goals and Plan of Care (Insert Text)  Description  FUNCTIONAL STATUS PRIOR TO ADMISSION: Patient initially reporting modified independent with all mobility but then stated daughter-in-law assists with all mobility and ADLs    HOME SUPPORT PRIOR TO ADMISSION: The patient lived with son and daughter-in law who assist with all mobility and ADLs. Physical Therapy Goals  Initiated 2/19/2020  1. Patient will move from supine to sit and sit to supine , scoot up and down and roll side to side in bed with minimal assistance/contact guard assist within 7 day(s). 2.  Patient will transfer from bed to chair and chair to bed with minimal assistance/contact guard assist using the least restrictive device within 7 day(s). 3.  Patient will perform sit to stand with minimal assistance/contact guard assist within 7 day(s). 4.  Patient will ambulate with minimal assistance/contact guard assist for 75 feet with the least restrictive device within 7 day(s). 5.  Patient will ascend/descend 2 stairs with 1 handrail(s) with minimal assistance/contact guard assist within 7 day(s). Outcome: Not Met   PHYSICAL THERAPY EVALUATION  Patient: Shantell Kumar (66 y.o. female)  Date: 2/19/2020  Primary Diagnosis: Sepsis ([facility] [address] [A41.9]  Thrombocytopenia ([address] [D37.2]  Complicated UTI (urinary tract infection) [N39.0]  Acute encephalopathy [G93.40]        Precautions: falls        ASSESSMENT  Based on the objective data described below, the patient presents with generalized weakness and impaired functional mobility, balance and endurance following admission for complicated UTI and thrombocytopenia. Patient is alert and oriented x 4, and noted to appear very anxious once initiating mobility. Patient requiring min to mod A of 2 and increased encouragement to complete mobility today and was only able to take a few steps bed to chair.  Patient lives with family and reports receiving assistance with all aspects of care. She states she ambulates short distances in home using RW or by holding onto walker. States she has had no falls. Patient also states family to planning to transition her to [facility]. Recommend SNF following discharge. Patient would also benefit from OT consult to assess ADLs    Current Level of Function Impacting Discharge (mobility/balance): min to mod A of 2    Functional Outcome Measure: The patient scored 0/20 on the Elderly Mobility scale outcome measure which is indicative of dependence for overall mobility. Patient will benefit from skilled therapy intervention to address the above noted impairments. PLAN :  Recommendations and Planned Interventions: bed mobility training, transfer training, gait training, patient and family training/education and therapeutic activities      Frequency/Duration: Patient will be followed by physical therapy:  4 times a week to address goals. Recommendation for discharge: (in order for the patient to meet his/her long term goals)  Therapy up to 5 days/week in SNF setting    This discharge recommendation:  Has been made in collaboration with the attending provider and/or case management    IF patient discharges home will need the following DME: patient owns DME required for discharge         SUBJECTIVE:   Patient stated I don't like this. This scares me.     OBJECTIVE DATA SUMMARY:   HISTORY:    Past Medical History:   Diagnosis Date    Arthritis     Diabetes ([address]     Hypertension      Past Surgical History:   [facility]           Home Situation  Home Environment: Private residence(plans to move to [facility])  # Steps to Enter: 2  Rails to Enter: Yes  Hand Rails : Bilateral  One/Two Story Residence: Two story, live on 1st floor  Living Alone: No  Support Systems: Child(catalina), Family member(s)  Patient Expects to be Discharged to[de-identified] Private residence  Current DME Used/Available at Home: Grab bars, Hospital bed, Shower chair, Walker, Wheelchair  Tub or Shower Type: Shower    EXAMINATION/PRESENTATION/DECISION MAKING:   Critical Behavior:  Neurologic State: Alert  Orientation Level: Oriented X4  Cognition: Appropriate decision making     Hearing: Auditory  Auditory Impairment: Hard of hearing, bilateral  Hearing Aids/Status: At home    Range Of Motion:  AROM: Generally decreased, functional                       Strength:    Strength: Generally decreased, functional                    Tone & Sensation:   Tone: Normal                              Coordination:  Coordination: Within functional limits       Functional Mobility:  Bed Mobility:  Rolling: Moderate assistance  Supine to Sit: Moderate assistance;Assist x2     Scooting: Maximum assistance(initially progressing to CGA)  Transfers:  Sit to Stand: Minimum assistance;Assist x2; Additional time  Stand to Sit: Contact guard assistance;Assist x2; Additional time        Bed to Chair: Minimum assistance; Additional time              Balance:   Sitting: Impaired  Sitting - Static: Good (unsupported); Not tested  Sitting - Dynamic: Not tested  Standing: Impaired  Standing - Static: Fair;Constant support  Standing - Dynamic : Fair;Constant support(using RW )  Ambulation/Gait Training:  Distance (ft): 3 Feet (ft)  Assistive Device: Walker, rolling;Gait belt  Ambulation - Level of Assistance: Minimal assistance        Gait Abnormalities: Decreased step clearance        Base of Support: Widened     Speed/Mallory: Pace decreased (<100 feet/min); Slow;Shuffled  Step Length: Right shortened;Left shortened      Patient only able to take a few steps bed to chair using RW for support; gait is slow and shuffled but no overt LOB noted       Functional Measure:    Elder Mobility Scale    0/20         Scores under 10  generally these patients are dependent in mobility maneuvers; require help with  basic ADL, such as transfers, toileting and dressing.     Scores between 10  13  generally these patients are borderline in terms of safe mobility and  independence in ADL i.e. they require some help with some mobility maneuvers. Scores over 14  Generally these patients are able to perform mobility maneuvers alone and safely  and are independent in basic ADL. Activity Tolerance:   Poor and requires frequent rest breaks  Please refer to the flowsheet for vital signs taken during this treatment. After treatment patient left in no apparent distress:   Sitting in chair and Call bell within reach    COMMUNICATION/EDUCATION:   The patients plan of care was discussed with: Registered Nurse and . Fall prevention education was provided and the patient/caregiver indicated understanding., Patient/family have participated as able in goal setting and plan of care. and Patient/family agree to work toward stated goals and plan of care.     Thank you for this referral.  Kong Herring, PT   Time Calculation: 32 mins

## 2020-02-19 NOTE — H&P
Hospitalist Admission Note    NAME: Susie Hough   :  1935   MRN:  [id number]     Date/Time:  2020 10:01 PM    Patient PCP: Vidal Mendez MD  ______________________________________________________________________  Given the patient's current clinical presentation, I have a high level of concern for decompensation if discharged from the emergency department. Complex decision making was performed, which includes reviewing the patient's available past medical records, laboratory results, and x-ray films. My assessment of this patient's clinical condition and my plan of care is as follows.     Assessment / Plan:      Acute thrombocytopenia   Platelet count 96,264 only   Peripheral blood smear does not have schistocytes as.  ED doctor since he talked with the lab  No anemia, active bleeding  CT head did not reveal intracranial bleed   On aspirin at home, will hold  Hematology was consulted stat from the ER, they do not think this is TTP and recommended fibrinogen, haptoglobin, LDH which was sent  Follow CBC daily  Hold all anticoagulation including DVT prophylaxis    Complicated UTI   Suprapubic pain, pyuria and large leukocyte esterase  Was started on ceftriaxone in the ER, will continue  Follow-up with urine cultures  CT abdomen did not reveal any pyelonephritis or hydronephrosis    Acute kidney injury  Likely prerenal from dehydration   On losartan at home, will hold   IV fluids   Follow-up BMP daily    Hypertension  Controlled  On losartan at home, will hold due to NICK   Start hydralazine as needed for now and resume losartan once creatinine back to baseline    Code Status: DNR  Surrogate Decision MakeMarilu Ledbetter Child [phone]       DVT Prophylaxis: SCDs  GI Prophylaxis: not indicated    Baseline: Lives with her son and daughter-in-law, moves around with other assistant      Subjective:   CHIEF COMPLAINT: Suprapubic pain, low platelets    HISTORY OF PRESENT ILLNESS:       The patient is an 81years old woman with past medical history hypertension presented emergency department due to suprapubic pain and low platelets. She reported that she was called by her primary care physician to let her know that her platelet count is 67,093 and she needs to go to the emergency department. Patient denied any blood in the urine, or in the stool, any recent bleeding. She also denied any recent infection, medication, eating outside, chest pain, shortness of breath, palpitation. Patient chronically on losartan and aspirin and no other blood thinner. She reported that she had multiple urinary tract infection in the past.    In the ER she was found to have platelet count of 17,249 without anemia but with NICK for which we were concerned about TTP and we asked ED to call hematology. ED they called hematologist and they have no concerns about TTP at this time but they recommended to send for fibrinogen and LDH. We were asked to admit for work up and evaluation of the above problems. Past Medical History:   Diagnosis Date    Arthritis     Diabetes ([facility] [address]     Hypertension         Past Surgical History:   Procedure Laterality Date    HX ORTHOPAEDIC         Social History     Tobacco Use    Smoking status: Never Smoker   Substance Use Topics    Alcohol use: No        Family History   Problem Relation Age of Onset    Parkinson's Disease Neg Hx     Seizures Neg Hx     Huntingtons disease Neg Hx      Allergies   Allergen Reactions    Sulfa (Sulfonamide Antibiotics) Hives        Prior to Admission medications    Medication Sig Start Date End Date Taking? Authorizing Provider   losartan (COZAAR) 50 mg tablet Take  by mouth daily. Provider, Historical   sertraline (ZOLOFT) 50 mg tablet Take  by mouth daily. Provider, Historical   timolol (TIMOPTIC) 0.5 % ophthalmic solution 1 Drop as needed.     Provider, Historical   nystatin (MYCOSTATIN) powder Apply  to affected area as needed. Provider, Historical   oxyCODONE-acetaminophen (PERCOCET) 5-325 mg per tablet Take 1 Tab by mouth every four (4) hours as needed for Pain. Provider, Historical   aspirin delayed-release 81 mg tablet Take  by mouth daily. Provider, Historical   cholecalciferol (VITAMIN D3) 1,000 unit cap Take  by mouth daily. Provider, Historical   diphenhydrAMINE (BENADRYL) 25 mg tablet Take 25 mg by mouth as needed for Sleep. Provider, Historical       REVIEW OF SYSTEMS:     I am not able to complete the review of systems because:    The patient is intubated and sedated    The patient has altered mental status due to his acute medical problems    The patient has baseline aphasia from prior stroke(s)    The patient has baseline dementia and is not reliable historian    The patient is in acute medical distress and unable to provide information           Total of 12 systems reviewed as follows:       POSITIVE= underlined text  Negative = text not underlined  General:  fever, chills, sweats, generalized weakness, weight loss/gain,      loss of appetite   Eyes:    blurred vision, eye pain, loss of vision, double vision  ENT:    rhinorrhea, pharyngitis   Respiratory:   cough, sputum production, SOB, ROGEL, wheezing, pleuritic pain   Cardiology:   chest pain, palpitations, orthopnea, PND, edema, syncope   Gastrointestinal:  abdominal pain , N/V, diarrhea, dysphagia, constipation, bleeding   Genitourinary:  frequency, urgency, dysuria, hematuria, incontinence   Muskuloskeletal :  arthralgia, myalgia, back pain  Hematology:  easy bruising, nose or gum bleeding, lymphadenopathy   Dermatological: rash, ulceration, pruritis, color change / jaundice  Endocrine:   hot flashes or polydipsia   Neurological:  headache, dizziness, confusion, focal weakness, paresthesia,     Speech difficulties, memory loss, gait difficulty  Psychological: Feelings of anxiety, depression, agitation    Objective:   VITALS:    Visit Vitals  /61   Pulse 86   Temp 97.9 °F (36.6 °C)   Resp 16   Ht 5' 2\" (1.575 m)   Wt 81.6 kg (180 lb)   SpO2 95%   BMI 32.92 kg/m²       PHYSICAL EXAM:    General:    Alert, cooperative, no distress, appears stated age. HEENT: Atraumatic, anicteric sclerae, pink conjunctivae     No oral ulcers, mucosa moist, throat clear, dentition fair  Neck:  Supple, symmetrical,  thyroid: non tender  Lungs:   Clear to auscultation bilaterally. No Wheezing or Rhonchi. No rales. Chest wall:  No tenderness  No Accessory muscle use. Heart:   Regular  rhythm,  No  murmur   No edema  Abdomen:   Soft, suprapubic tenderness. Not distended. Bowel sounds normal  Extremities: No cyanosis. No clubbing,      Skin turgor normal, Capillary refill normal, Radial dial pulse 2+  Skin:     Not pale. Not Jaundiced  No rashes   Psych:  Good insight. Not depressed. Not anxious or agitated. Neurologic: EOMs intact. No facial asymmetry. No aphasia or slurred speech. Symmetrical strength, Sensation grossly intact.  Alert and oriented X 4.     _______________________________________________________________________  Care Plan discussed with:    Comments   Patient x    Family  x    RN x    Care Manager                    Consultant:      _______________________________________________________________________  Expected  Disposition:   Home with Family    HH/PT/OT/RN    SNF/LTC x   EVELIA    ________________________________________________________________________  TOTAL TIME:  48 Minutes    Critical Care Provided     Minutes non procedure based      Comments     Reviewed previous records   >50% of visit spent in counseling and coordination of care  Discussion with patient and/or family and questions answered       ________________________________________________________________________  Signed: Susie Gonzalez MD    Procedures: see electronic medical records for all procedures/Xrays and details which were not copied into this note but were reviewed prior to creation of Plan. LAB DATA REVIEWED:    Recent Results (from the past 24 hour(s))   METABOLIC PANEL, COMPREHENSIVE    Collection Time: 02/18/20  6:48 PM   Result Value Ref Range    Sodium 136 136 - 145 mmol/L    Potassium 4.0 3.5 - 5.1 mmol/L    Chloride 105 97 - 108 mmol/L    CO2 24 21 - 32 mmol/L    Anion gap 7 5 - 15 mmol/L    Glucose 109 (H) 65 - 100 mg/dL    BUN 27 (H) 6 - 20 MG/DL    Creatinine 1.58 (H) 0.55 - 1.02 MG/DL    BUN/Creatinine ratio 17 12 - 20      GFR est AA 38 (L) >60 ml/min/1.73m2    GFR est non-AA 31 (L) >60 ml/min/1.73m2    Calcium 9.1 8.5 - 10.1 MG/DL    Bilirubin, total 0.6 0.2 - 1.0 MG/DL    ALT (SGPT) 10 (L) 12 - 78 U/L    AST (SGOT) 13 (L) 15 - 37 U/L    Alk. phosphatase 69 45 - 117 U/L    Protein, total 7.1 6.4 - 8.2 g/dL    Albumin 2.9 (L) 3.5 - 5.0 g/dL    Globulin 4.2 (H) 2.0 - 4.0 g/dL    A-G Ratio 0.7 (L) 1.1 - 2.2     TROPONIN I    Collection Time: 02/18/20  6:48 PM   Result Value Ref Range    Troponin-I, Qt. <0.05 <0.05 ng/mL   CBC WITH AUTOMATED DIFF    Collection Time: 02/18/20  6:48 PM   Result Value Ref Range    WBC 13.4 (H) 3.6 - 11.0 K/uL    RBC 4.45 3.80 - 5.20 M/uL    HGB 11.4 (L) 11.5 - 16.0 g/dL    HCT 36.1 35.0 - 47.0 %    MCV 81.1 80.0 - 99.0 FL    MCH 25.6 (L) 26.0 - 34.0 PG    MCHC 31.6 30.0 - 36.5 g/dL    RDW 14.0 11.5 - 14.5 %    PLATELET 12 (LL) 497 - 400 K/uL    MPV ABNORMAL 8.9 - 12.9 FL    NRBC 0.0 0  WBC    ABSOLUTE NRBC 0.00 0.00 - 0.01 K/uL    NEUTROPHILS 58 32 - 75 %    LYMPHOCYTES 30 12 - 49 %    MONOCYTES 7 5 - 13 %    EOSINOPHILS 3 0 - 7 %    BASOPHILS 1 0 - 1 %    IMMATURE GRANULOCYTES 1 (H) 0.0 - 0.5 %    ABS. NEUTROPHILS 7.9 1.8 - 8.0 K/UL    ABS. LYMPHOCYTES 4.0 (H) 0.8 - 3.5 K/UL    ABS. MONOCYTES 0.9 0.0 - 1.0 K/UL    ABS. EOSINOPHILS 0.4 0.0 - 0.4 K/UL    ABS. BASOPHILS 0.1 0.0 - 0.1 K/UL    ABS. IMM.  GRANS. 0.1 (H) 0.00 - 0.04 K/UL    DF AUTOMATED      RBC COMMENTS GUY CELLS  PRESENT       LIPASE    Collection Time: 02/18/20  6:48 PM   Result Value Ref Range    Lipase 81 73 - 393 U/L   PROCALCITONIN    Collection Time: 02/18/20  6:48 PM   Result Value Ref Range    Procalcitonin <0.05 ng/mL   LACTIC ACID    Collection Time: 02/18/20  6:49 PM   Result Value Ref Range    Lactic acid 1.0 0.4 - 2.0 MMOL/L   PROTHROMBIN TIME + INR    Collection Time: 02/18/20  6:49 PM   Result Value Ref Range    INR 1.1 0.9 - 1.1      Prothrombin time 11.1 9.0 - 11.1 sec   BLOOD TYPE, (ABO+RH)    Collection Time: 02/18/20  6:49 PM   Result Value Ref Range    ABO/Rh(D) O POSITIVE     Comment SAMPLE NOT USABLE FOR CROSSMATCH    TSH 3RD GENERATION    Collection Time: 02/18/20  6:49 PM   Result Value Ref Range    TSH 0.05 (L) 0.36 - 3.74 uIU/mL   URINALYSIS W/ RFLX MICROSCOPIC    Collection Time: 02/18/20  7:34 PM   Result Value Ref Range    Color YELLOW/STRAW      Appearance TURBID (A) CLEAR      Specific gravity 1.018 1.003 - 1.030      pH (UA) 5.5 5.0 - 8.0      Protein 100 (A) NEG mg/dL    Glucose NEGATIVE  NEG mg/dL    Ketone NEGATIVE  NEG mg/dL    Bilirubin NEGATIVE  NEG      Blood MODERATE (A) NEG      Urobilinogen 0.2 0.2 - 1.0 EU/dL    Nitrites POSITIVE (A) NEG      Leukocyte Esterase LARGE (A) NEG      WBC >100 /hpf    RBC 0-5 /hpf    Epithelial cells MODERATE /lpf    Bacteria 2+ /hpf    Mucus TRACE /lpf

## 2020-02-19 NOTE — PROGRESS NOTES
Reason for Admission:   Sepsis; Thrombocytopenia                  RUR Score:  10 Low risk for admission                Do you (patient/family) have any concerns for transition/discharge? None                 Plan for utilizing home health:  Pt has had home health in the past. Pt is open to home health if needed at d/c. Current Advanced Directive/Advance Care Plan:  Pt is DNR code status. Pt does not have an ACP on file. CM discussed with pt about AMD. Pt stated that she had an AMD at home. Transition of Care Plan:        Home   Follow-up Appointments  2nd IM Letter    CM met with pt at bedside to discuss d/c plan. Pt was alert and oriented. CM also called pt's son via phone to discuss d/c plan. CM verified pt's demographics, insurance and PCP. Pt is a 80 y/o  female admitted to [facility] on 2/18/2020 for Sepsis and Thrombocytopenia. Pt's PCP is Dr. Zaheer Rader. Pt sees PCP once a year. Pt uses [address] in [place] for Rx. Pt resides with her son in a two-level home with 2 DOMINIK. Pt has private aide Thursday through Sunday. Pt will be transitioning to assisted living at [facility] next week. Pt's son stated that his wife and him have been providing care of pt. Pt needs assistance of with independent with ADL's and IADL's. Pt does not drive. Pt's son or daughter-in-law transports when necessary. Pt has a cane, walker, nebulizer and shower chair. Pt has had home health in the past. No SNF or acute inpatient rehab in the past. Pt is DNR code status. Pt does not have an ACP on file. Pt's son Rosalina Vargas ([phone]) will transport at d/c.     CM discussed with pt's son about SNF. Pt's son declined SNF. CM discussed with pt's son about HH. Pt's son declined home health. CM discussed with pt about hospital to home visit. Pt declined hospital to home visit.      Care Management Interventions  PCP Verified by CM: Yes(Pt's PCP is Dr. Zaheer Rader. Pt sees PCP once a year. )  Palliative Care Criteria Met (RRAT>21 & CHF Dx)?: No  Mode of Transport at Discharge: Other (see comment)(Pt's son Alesia Nelson ([phone]) will transport at d/c. )  Transition of Care Consult (CM Consult): Discharge Planning  Discharge Durable Medical Equipment: Yes(Pt has a cane, walker, nebulizer and shower chair. )  Physical Therapy Consult: Yes  Occupational Therapy Consult: No  Speech Therapy Consult: No  Current Support Network: Relative's Home, Family Lives Nearby(Pt resides with her son in a two-level home with 2 DOMINIK.)  Confirm Follow Up Transport: Family( Pt does not drive. Pt's son or daughter-in-law transports when necessary. P)  [employer] Information Provided?: No  Discharge Location  Discharge Placement: (Home with follow-up appointments)    CM will continue to follow patient for discharge planning needs and arrange for services as deemed necessary.     Shantal Moses Cha [address]  [phone]

## 2020-02-19 NOTE — PROGRESS NOTES
Hospitalist Progress Note    NAME: Deven Mejía   :  1935   MRN:  [id number]       Assessment / Plan:  Acute thrombocytopenia   Platelet count 70,474 only   Peripheral blood smear does not have schistocytes as.  ED doctor since he talked with the lab  No anemia, active bleeding  CT head did not reveal intracranial bleed   On aspirin at home, will hold  Hematology was consulted stat from the ER, they do not think this is TTP and recommended fibrinogen, haptoglobin, LDH which was sent  Follow CBC daily  Hold all anticoagulation including DVT prophylaxis     :  Plt remains stable, but still low. Hematology eval on going. Pt denies any current evidence of mucosal bleeding. Continue holding ASA, monitor CBC    Complicated UTI   Suprapubic pain, pyuria and large leukocyte esterase  Was started on ceftriaxone in the ER, will continue  Follow-up with urine cultures  CT abdomen did not reveal any pyelonephritis or hydronephrosis    :  Continue with IV abx - follow up UCx.     Acute kidney injury  Likely prerenal from dehydration   On losartan at home, will hold   IV fluids   Follow-up BMP daily     :  Cr improving already, continue with IVF    Hypertension  Controlled  On losartan at home, will hold due to NICK   Start hydralazine as needed for now and resume losartan once creatinine back to baseline    30.0 - 39.9 Obese / Body mass index is 32.92 kg/m². Code status: DNR  Prophylaxis: SCD's  Recommended Disposition:  PT, OT, RN     Subjective:     Chief Complaint / Reason for Physician Visit  Feels better today compared to yesterday. Discussed with RN events overnight.      Review of Systems:  Symptom Y/N Comments  Symptom Y/N Comments   Fever/Chills n   Chest Pain n    Poor Appetite    Edema     Cough n   Abdominal Pain n    Sputum    Joint Pain     SOB/ROGEL n   Pruritis/Rash     Nausea/vomit    Tolerating PT/OT y    Diarrhea    Tolerating Diet y    Constipation    Other Could NOT obtain due to:      Objective:     VITALS:   Last 24hrs VS reviewed since prior progress note. Most recent are:  Patient Vitals for the past 24 hrs:   Temp Pulse Resp BP SpO2   02/19/20 0720 98.4 °F (36.9 °C) 89 15 142/71 97 %   02/18/20 2318 97.7 °F (36.5 °C) 86 14 135/66 96 %   02/18/20 2200  87  148/54 96 %   02/18/20 2139     95 %   02/18/20 2130  86  141/61 95 %   02/18/20 2115  85  147/61 95 %   02/18/20 2100  85  146/56 98 %   02/18/20 1800    121/45 98 %   02/18/20 1701 97.9 °F (36.6 °C) 94 16 134/58 100 %       Intake/Output Summary (Last 24 hours) at 2/19/2020 1052  Last data filed at 2/19/2020 0649  Gross per 24 hour   Intake 1118.33 ml   Output    Net 1118.33 ml        PHYSICAL EXAM:  General: WD, WN. Alert, cooperative, no acute distress    EENT:  EOMI. Anicteric sclerae. MMM  Resp:  CTA bilaterally, no wheezing or rales. No accessory muscle use  CV:  Regular  rhythm,  No edema  GI:  Soft, Non distended, Non tender.  +Bowel sounds  Neurologic:  Alert and oriented X 3, normal speech,   Psych:   Good insight. Not anxious nor agitated  Skin:  No rashes. No jaundice    Reviewed most current lab test results and cultures  YES  Reviewed most current radiology test results   YES  Review and summation of old records today    NO  Reviewed patient's current orders and MAR    YES  PMH/ reviewed - no change compared to H&P  ________________________________________________________________________  Care Plan discussed with:    Comments   Patient x    Family      RN x    Care Manager     Consultant                        Multidiciplinary team rounds were held today with , nursing, pharmacist and clinical coordinator. Patient's plan of care was discussed; medications were reviewed and discharge planning was addressed.      ________________________________________________________________________  Total NON critical care TIME:  35   Minutes    Total CRITICAL CARE TIME Spent: Minutes non procedure based      Comments   >50% of visit spent in counseling and coordination of care     ________________________________________________________________________  Ponce Romeo MD     Procedures: see electronic medical records for all procedures/Xrays and details which were not copied into this note but were reviewed prior to creation of Plan. LABS:  I reviewed today's most current labs and imaging studies.   Pertinent labs include:  Recent Labs     02/19/20 0405 02/18/20 1848   WBC 9.9 13.4*   HGB 10.8* 11.4*   HCT 34.2* 36.1   PLT 12* 12*     Recent Labs     02/19/20 0405 02/18/20 1849 02/18/20 1848     --  136   K 3.8  --  4.0     --  105   CO2 24  --  24   *  --  109*   BUN 22*  --  27*   CREA 1.38*  --  1.58*   CA 9.0  --  9.1   ALB  --   --  2.9*   TBILI  --   --  0.6   SGOT  --   --  13*   ALT  --   --  10*   INR  --  1.1  --        Signed: Ponce Romeo MD

## 2020-02-20 ENCOUNTER — APPOINTMENT (OUTPATIENT)
Dept: CT IMAGING | Age: 85
DRG: 813 | End: 2020-02-20
Attending: INTERNAL MEDICINE
Payer: MEDICARE

## 2020-02-20 LAB
ALBUMIN SERPL-MCNC: 2.6 G/DL (ref 3.5–5)
ALBUMIN/GLOB SERPL: 0.7 {RATIO} (ref 1.1–2.2)
ALP SERPL-CCNC: 66 U/L (ref 45–117)
ALT SERPL-CCNC: 9 U/L (ref 12–78)
ANION GAP SERPL CALC-SCNC: 8 MMOL/L (ref 5–15)
AST SERPL-CCNC: 11 U/L (ref 15–37)
BASOPHILS # BLD: 0.1 K/UL (ref 0–0.1)
BASOPHILS NFR BLD: 1 % (ref 0–1)
BILIRUB SERPL-MCNC: 0.4 MG/DL (ref 0.2–1)
BUN SERPL-MCNC: 20 MG/DL (ref 6–20)
BUN/CREAT SERPL: 15 (ref 12–20)
CALCIUM SERPL-MCNC: 8.5 MG/DL (ref 8.5–10.1)
CHLORIDE SERPL-SCNC: 108 MMOL/L (ref 97–108)
CO2 SERPL-SCNC: 24 MMOL/L (ref 21–32)
CREAT SERPL-MCNC: 1.32 MG/DL (ref 0.55–1.02)
DIFFERENTIAL METHOD BLD: ABNORMAL
EOSINOPHIL # BLD: 0.5 K/UL (ref 0–0.4)
EOSINOPHIL NFR BLD: 5 % (ref 0–7)
ERYTHROCYTE [DISTWIDTH] IN BLOOD BY AUTOMATED COUNT: 14.2 % (ref 11.5–14.5)
GLOBULIN SER CALC-MCNC: 4 G/DL (ref 2–4)
GLUCOSE SERPL-MCNC: 99 MG/DL (ref 65–100)
HCT VFR BLD AUTO: 34.8 % (ref 35–47)
HGB BLD-MCNC: 10.9 G/DL (ref 11.5–16)
IMM GRANULOCYTES # BLD AUTO: 0 K/UL (ref 0–0.04)
IMM GRANULOCYTES NFR BLD AUTO: 0 % (ref 0–0.5)
LYMPHOCYTES # BLD: 3.5 K/UL (ref 0.8–3.5)
LYMPHOCYTES NFR BLD: 38 % (ref 12–49)
MCH RBC QN AUTO: 25.8 PG (ref 26–34)
MCHC RBC AUTO-ENTMCNC: 31.3 G/DL (ref 30–36.5)
MCV RBC AUTO: 82.3 FL (ref 80–99)
MONOCYTES # BLD: 0.7 K/UL (ref 0–1)
MONOCYTES NFR BLD: 8 % (ref 5–13)
NEUTS SEG # BLD: 4.4 K/UL (ref 1.8–8)
NEUTS SEG NFR BLD: 48 % (ref 32–75)
NRBC # BLD: 0 K/UL (ref 0–0.01)
NRBC BLD-RTO: 0 PER 100 WBC
PLATELET # BLD AUTO: 13 K/UL (ref 150–400)
PLATELET COMMENTS,PCOM: ABNORMAL
PMV BLD AUTO: 11.6 FL (ref 8.9–12.9)
POTASSIUM SERPL-SCNC: 3.7 MMOL/L (ref 3.5–5.1)
PROT SERPL-MCNC: 6.6 G/DL (ref 6.4–8.2)
RBC # BLD AUTO: 4.23 M/UL (ref 3.8–5.2)
RBC MORPH BLD: ABNORMAL
SODIUM SERPL-SCNC: 140 MMOL/L (ref 136–145)
WBC # BLD AUTO: 9.2 K/UL (ref 3.6–11)

## 2020-02-20 PROCEDURE — 88374 M/PHMTRC ALYS ISHQUANT/SEMIQ: CPT

## 2020-02-20 PROCEDURE — 74011250636 HC RX REV CODE- 250/636: Performed by: INTERNAL MEDICINE

## 2020-02-20 PROCEDURE — 80053 COMPREHEN METABOLIC PANEL: CPT

## 2020-02-20 PROCEDURE — 74011250637 HC RX REV CODE- 250/637: Performed by: INTERNAL MEDICINE

## 2020-02-20 PROCEDURE — 77030028872 HC BN BIOP NDL ON CNTRL TY TELE -C

## 2020-02-20 PROCEDURE — 88341 IMHCHEM/IMCYTCHM EA ADD ANTB: CPT

## 2020-02-20 PROCEDURE — 88313 SPECIAL STAINS GROUP 2: CPT

## 2020-02-20 PROCEDURE — 88237 TISSUE CULTURE BONE MARROW: CPT

## 2020-02-20 PROCEDURE — 65270000029 HC RM PRIVATE

## 2020-02-20 PROCEDURE — 88305 TISSUE EXAM BY PATHOLOGIST: CPT

## 2020-02-20 PROCEDURE — 74011250636 HC RX REV CODE- 250/636: Performed by: RADIOLOGY

## 2020-02-20 PROCEDURE — 85025 COMPLETE CBC W/AUTO DIFF WBC: CPT

## 2020-02-20 PROCEDURE — 74011000258 HC RX REV CODE- 258: Performed by: INTERNAL MEDICINE

## 2020-02-20 PROCEDURE — 88264 CHROMOSOME ANALYSIS 20-25: CPT

## 2020-02-20 PROCEDURE — P9035 PLATELET PHERES LEUKOREDUCED: HCPCS

## 2020-02-20 PROCEDURE — 36415 COLL VENOUS BLD VENIPUNCTURE: CPT

## 2020-02-20 PROCEDURE — 88311 DECALCIFY TISSUE: CPT

## 2020-02-20 PROCEDURE — 36430 TRANSFUSION BLD/BLD COMPNT: CPT

## 2020-02-20 PROCEDURE — 77030003666 HC NDL SPINAL BD -A

## 2020-02-20 PROCEDURE — 77030003375 HC MRK BIOP BEEK -A

## 2020-02-20 PROCEDURE — 99152 MOD SED SAME PHYS/QHP 5/>YRS: CPT

## 2020-02-20 PROCEDURE — 74011250637 HC RX REV CODE- 250/637: Performed by: GENERAL ACUTE CARE HOSPITAL

## 2020-02-20 PROCEDURE — 88342 IMHCHEM/IMCYTCHM 1ST ANTB: CPT

## 2020-02-20 PROCEDURE — 07DR3ZX EXTRACTION OF ILIAC BONE MARROW, PERCUTANEOUS APPROACH, DIAGNOSTIC: ICD-10-PCS | Performed by: RADIOLOGY

## 2020-02-20 PROCEDURE — 77030014115

## 2020-02-20 PROCEDURE — 88184 FLOWCYTOMETRY/ TC 1 MARKER: CPT

## 2020-02-20 PROCEDURE — 88185 FLOWCYTOMETRY/TC ADD-ON: CPT

## 2020-02-20 RX ORDER — MICONAZOLE NITRATE 20 MG/G
CREAM TOPICAL 2 TIMES DAILY
Status: DISCONTINUED | OUTPATIENT
Start: 2020-02-20 | End: 2020-02-23 | Stop reason: HOSPADM

## 2020-02-20 RX ORDER — DEXAMETHASONE 4 MG/1
40 TABLET ORAL DAILY
Status: DISCONTINUED | OUTPATIENT
Start: 2020-02-21 | End: 2020-02-23 | Stop reason: HOSPADM

## 2020-02-20 RX ORDER — FENTANYL CITRATE 50 UG/ML
100 INJECTION, SOLUTION INTRAMUSCULAR; INTRAVENOUS
Status: DISCONTINUED | OUTPATIENT
Start: 2020-02-20 | End: 2020-02-20

## 2020-02-20 RX ORDER — MIDAZOLAM HYDROCHLORIDE 1 MG/ML
5 INJECTION, SOLUTION INTRAMUSCULAR; INTRAVENOUS
Status: DISCONTINUED | OUTPATIENT
Start: 2020-02-20 | End: 2020-02-20

## 2020-02-20 RX ADMIN — VITAMIN D, TAB 1000IU (100/BT) 1 TABLET: 25 TAB at 09:42

## 2020-02-20 RX ADMIN — MICONAZOLE NITRATE: 20 CREAM TOPICAL at 18:21

## 2020-02-20 RX ADMIN — MIDAZOLAM 1 MG: 1 INJECTION INTRAMUSCULAR; INTRAVENOUS at 12:24

## 2020-02-20 RX ADMIN — CEFTRIAXONE SODIUM 1 G: 1 INJECTION, POWDER, FOR SOLUTION INTRAMUSCULAR; INTRAVENOUS at 00:20

## 2020-02-20 RX ADMIN — Medication 10 ML: at 05:26

## 2020-02-20 RX ADMIN — CEFTRIAXONE SODIUM 1 G: 1 INJECTION, POWDER, FOR SOLUTION INTRAMUSCULAR; INTRAVENOUS at 23:08

## 2020-02-20 RX ADMIN — MIDAZOLAM 1 MG: 1 INJECTION INTRAMUSCULAR; INTRAVENOUS at 12:07

## 2020-02-20 RX ADMIN — Medication 10 ML: at 14:00

## 2020-02-20 RX ADMIN — MIDAZOLAM 1 MG: 1 INJECTION INTRAMUSCULAR; INTRAVENOUS at 12:04

## 2020-02-20 RX ADMIN — FENTANYL CITRATE 25 MCG: 50 INJECTION, SOLUTION INTRAMUSCULAR; INTRAVENOUS at 12:04

## 2020-02-20 RX ADMIN — MIDAZOLAM 1 MG: 1 INJECTION INTRAMUSCULAR; INTRAVENOUS at 12:20

## 2020-02-20 RX ADMIN — DOCUSATE SODIUM 100 MG: 100 CAPSULE, LIQUID FILLED ORAL at 18:21

## 2020-02-20 RX ADMIN — Medication 10 ML: at 23:09

## 2020-02-20 RX ADMIN — FENTANYL CITRATE 25 MCG: 50 INJECTION, SOLUTION INTRAMUSCULAR; INTRAVENOUS at 12:20

## 2020-02-20 RX ADMIN — FENTANYL CITRATE 25 MCG: 50 INJECTION, SOLUTION INTRAMUSCULAR; INTRAVENOUS at 12:11

## 2020-02-20 RX ADMIN — DOCUSATE SODIUM 100 MG: 100 CAPSULE, LIQUID FILLED ORAL at 09:42

## 2020-02-20 RX ADMIN — SODIUM CHLORIDE 1000 ML: 900 INJECTION, SOLUTION INTRAVENOUS at 11:55

## 2020-02-20 NOTE — CONSULTS
[facility]  at [address] [place] [address], [address] [facility]  [phone]         Hematology Consultation        Patient: Susie Hough MRN: [id number]  SSN: [id number]    YOB: 1935  Age: 81 y.o. Sex: female        Subjective:      Susie Hough is a 81 y.o. female who I am seeing in consultation for severe thrombocytopenia. She underwent routine laboratory studies with PCP. PCP called her back with the results. She was asked to head to the hospital due to severe thrombocytopenia. She has been experiencing nose bleed, bleeding from the mouth and small petechial hemorrhages for 2-3 weeks. She denies recent fever or change in medicine. Review of Systems:    Constitutional: negative  Eyes: negative  Ears, Nose, Mouth, Throat, and Face: bleeding from the mouth and gums  Respiratory: negative  Cardiovascular: negative  Gastrointestinal: negative  Genitourinary:negative  Integument/Breast: petechaie  Hematologic/Lymphatic: negative  Musculoskeletal:negative  Neurological: negative      Past Medical History:   Diagnosis Date    Arthritis     Diabetes ([address]     Hypertension      Past Surgical History:   Procedure Laterality Date    HX ORTHOPAEDIC        Family History   Problem Relation Age of Onset    Parkinson's Disease Neg Hx     Seizures Neg Hx     Huntingtons disease Neg Hx      Social History     Tobacco Use    Smoking status: Never Smoker   Substance Use Topics    Alcohol use: No      Prior to Admission medications    Medication Sig Start Date End Date Taking? Authorizing Provider   losartan (COZAAR) 50 mg tablet Take  by mouth daily. Provider, Historical   sertraline (ZOLOFT) 50 mg tablet Take  by mouth daily. Provider, Historical   timolol (TIMOPTIC) 0.5 % ophthalmic solution 1 Drop as needed. Provider, Historical   nystatin (MYCOSTATIN) powder Apply  to affected area as needed. Provider, Historical   oxyCODONE-acetaminophen (PERCOCET) 5-325 mg per tablet Take 1 Tab by mouth every four (4) hours as needed for Pain. Provider, Historical   aspirin delayed-release 81 mg tablet Take  by mouth daily. Provider, Historical   cholecalciferol (VITAMIN D3) 1,000 unit cap Take  by mouth daily. Provider, Historical   diphenhydrAMINE (BENADRYL) 25 mg tablet Take 25 mg by mouth as needed for Sleep. Provider, Historical              Allergies   Allergen Reactions    Sulfa (Sulfonamide Antibiotics) Hives           Objective:     Vitals:    02/18/20 2318 02/19/20 0720 02/19/20 1508 02/19/20 2239   BP: 135/66 142/71 119/59 119/63   Pulse: 86 89 90 82   Resp: 14 15 18 16   Temp: 97.7 °F (36.5 °C) 98.4 °F (36.9 °C) 97.7 °F (36.5 °C) 98.2 °F (36.8 °C)   SpO2: 96% 97% 96% 95%   Weight:       Height:                Physical Exam:    GENERAL: alert, cooperative, no distress, appears stated age  EYE: conjunctivae/corneas clear. PERRL, EOM's intact. LYMPHATIC: Cervical, supraclavicular, and axillary nodes normal.   THROAT & NECK: normal and no erythema or exudates noted. LUNG: clear to auscultation bilaterally  HEART: regular rate and rhythm, S1, S2 normal, no murmur, click, rub or gallop  ABDOMEN: soft, non-tender. Bowel sounds normal. No masses,  no organomegaly  EXTREMITIES:  extremities normal, atraumatic, no cyanosis or edema  SKIN: petechaie on the legs  NEUROLOGIC: AOx3. Gait normal. Reflexes and motor strength normal and symmetric. Cranial nerves 2-12 and sensation grossly intact. CT Results (most recent):  Results from Hospital Encounter encounter on 02/18/20   CT ABD PELV WO CONT    Narrative EXAM: CT ABD PELV WO CONT    INDICATION: abd distention, AMS    COMPARISON: None    CONTRAST:  None. TECHNIQUE:   Thin axial images were obtained through the abdomen and pelvis. Coronal and  sagittal reconstructions were generated. Oral contrast was not administered.  CT  dose reduction was achieved through use of a standardized protocol tailored for  this examination and automatic exposure control for dose modulation. The absence of intravenous contrast material reduces the sensitivity for  evaluation of the solid parenchymal organs of the abdomen. FINDINGS:   LUNG BASES: Clear. INCIDENTALLY IMAGED HEART AND MEDIASTINUM: Coronary atherosclerotic  calcifications are present. LIVER: There is a 4.0 cm cyst in the right hepatic lobe. No biliary dilatation. GALLBLADDER: Unremarkable. SPLEEN: No mass. PANCREAS: No mass or ductal dilatation. ADRENALS: Unremarkable. KIDNEYS/URETERS: There is a 2.2 centers cyst in the anterior mid right kidney. There is a 2.0 cm cyst in the mid right kidney. There is a 1.9 cm cyst in the  inferior pole the right kidney. There was a 1.8 cm cyst in the posterior left  kidney. There is a 1.0 cm cyst in the anterior left kidney. Several small  lesions in the inferior pole left kidney are too small to fully characterize,  but likely represent small cysts as well. No hydronephrosis. No nephrolithiasis. STOMACH: Unremarkable. SMALL BOWEL: No dilatation or wall thickening. COLON: No dilatation or wall thickening. Several diverticula are present. Status  post right hemicolectomy. APPENDIX: Surgically absent. PERITONEUM: No ascites or pneumoperitoneum. RETROPERITONEUM: No lymphadenopathy or aortic aneurysm. REPRODUCTIVE ORGANS: Status post hysterectomy. URINARY BLADDER: No mass or calculus. BONES: No destructive bone lesion. There is a chronic appearing moderate  compression deformity of L3.  ADDITIONAL COMMENTS: N/A      Impression IMPRESSION:  No evidence of acute process. Incidentals as above. I personally reviewed the images.  No organomegaly      LABS:     Lab Results   Component Value Date/Time    WBC 9.9 02/19/2020 04:05 AM    HGB 10.8 (L) 02/19/2020 04:05 AM    HCT 34.2 (L) 02/19/2020 04:05 AM    PLATELET 12 (LL) 28/18/6884 04:05 AM    MCV 83.0 02/19/2020 04:05 AM            Assessment:     1. Severe thrombocytopenia    I spent 65 minute with the patient in a face-to-face encounter. I explained her the possible diagnosis, pathophysiology of the disease and the treatment approaches. I answered all her questions. More than 50% of the time was utilized in education, counseling and co-ordination of care. Two possible etiologies are bone marrow disorders such as MDS or cITP. Due to advanced age, I shall obtain a bone marrow in AM  Then I would treat her empirically with oral Dex 40 mg daily for 4 days  May consider adding IVIG      Plan:       1. Bone marrow biopsy in AM  2. 1 unit of platelet transfusion prior to bone marrow Bx  3.  Plan to start Dex 40 mg daily for 4 days      Signed by: Marciano Summers MD                     February 19, 2020

## 2020-02-20 NOTE — WOUND CARE
Wound care nurse consult from staff nurse stating \" excoriated buttocks with rash-like appearance\". Patient is a 82 y/o CF admitted for thrombocytopenia. She is 5'2\", 180 lbs, incontinent of urine. Past Medical History:  
Diagnosis Date  Arthritis  Diabetes ([facility]  Hypertension Patient has MASD to buttocks with a yeast type rash. Recommend: 
 
Buttocks and sacrum: cleanse gently with soap and water. Pat dry and apply Secura antifungal zinc cream to buttocks.  
 
Jasmin Rubio RN, [employer]

## 2020-02-20 NOTE — PROGRESS NOTES
Bedside shift change report given to NELLY Vazquez (oncoming nurse) by Chai Mccoy (offgoing nurse). Report included the following information SBAR, Kardex, Intake/Output, MAR and Recent Results.

## 2020-02-20 NOTE — PROGRESS NOTES
Hospitalist Progress Note    NAME: Karena Delvalle   :  1935   MRN:  [id number]       Assessment / Plan:  Acute thrombocytopenia   Platelet count 83,192 only   Peripheral blood smear does not have schistocytes as.  ED doctor since he talked with the lab  No anemia, active bleeding  CT head did not reveal intracranial bleed   On aspirin at home, will hold  Hematology was consulted stat from the ER, they do not think this is TTP and recommended fibrinogen, haptoglobin, LDH which was sent  Follow CBC daily  Hold all anticoagulation including DVT prophylaxis     :  Plt remains stable, but still low. Hematology eval on going. Pt denies any current evidence of mucosal bleeding. Continue holding ASA, monitor CBC    :  Pt for BM biopsy this morning  Appreciate Hematology evals  Plt 13k, no indication to transfuse Plt currently, continue holding ASA and monitor CBC    Complicated UTI   Suprapubic pain, pyuria and large leukocyte esterase  Was started on ceftriaxone in the ER, will continue  Follow-up with urine cultures  CT abdomen did not reveal any pyelonephritis or hydronephrosis    :  Continue with IV abx - follow up UCx.    :  UCx was never sent -?why  Complete 7 day course of abx    Acute kidney injury - improving  Likely prerenal from dehydration   On losartan at home, will hold   IV fluids   Follow-up BMP daily     :  Cr improving already, continue with IVF    :  Cr continues to improve    Hypertension  Controlled  On losartan at home, will hold due to NICK   Start hydralazine as needed for now and resume losartan once creatinine back to baseline    30.0 - 39.9 Obese / Body mass index is 32.92 kg/m². Code status: DNR  Prophylaxis: SCD's  Recommended Disposition:  PT, OT, RN     Subjective:     Chief Complaint / Reason for Physician Visit  Feels better today compared to yesterday. Discussed with RN events overnight.      Review of Systems:  Symptom Y/N Comments  Symptom Y/N Comments   Fever/Chills n   Chest Pain n    Poor Appetite    Edema     Cough n   Abdominal Pain n    Sputum    Joint Pain     SOB/ROGEL n   Pruritis/Rash     Nausea/vomit    Tolerating PT/OT y    Diarrhea    Tolerating Diet y    Constipation    Other       Could NOT obtain due to:      Objective:     VITALS:   Last 24hrs VS reviewed since prior progress note. Most recent are:  Patient Vitals for the past 24 hrs:   Temp Pulse Resp BP SpO2   02/20/20 1051 98.9 °F (37.2 °C) 82 16 140/78 90 %   02/20/20 0754 98 °F (36.7 °C) 84 16 159/88 98 %   02/19/20 2239 98.2 °F (36.8 °C) 82 16 119/63 95 %   02/19/20 1508 97.7 °F (36.5 °C) 90 18 119/59 96 %       Intake/Output Summary (Last 24 hours) at 2/20/2020 1101  Last data filed at 2/19/2020 1800  Gross per 24 hour   Intake 480 ml   Output    Net 480 ml        PHYSICAL EXAM:  General: WD, WN. Alert, cooperative, no acute distress    EENT:  EOMI. Anicteric sclerae. MMM  Resp:  CTA bilaterally, no wheezing or rales. No accessory muscle use  CV:  Regular  rhythm,  No edema  GI:  Soft, Non distended, Non tender.  +Bowel sounds  Neurologic:  Alert and oriented X 3, normal speech,   Psych:   Good insight. Not anxious nor agitated  Skin:  No rashes. No jaundice    Reviewed most current lab test results and cultures  YES  Reviewed most current radiology test results   YES  Review and summation of old records today    NO  Reviewed patient's current orders and MAR    YES  PMH/ reviewed - no change compared to H&P  ________________________________________________________________________  Care Plan discussed with:    Comments   Patient x    Family      RN x    Care Manager     Consultant                        Multidiciplinary team rounds were held today with , nursing, pharmacist and clinical coordinator. Patient's plan of care was discussed; medications were reviewed and discharge planning was addressed. ________________________________________________________________________  Total NON critical care TIME:  35   Minutes    Total CRITICAL CARE TIME Spent:   Minutes non procedure based      Comments   >50% of visit spent in counseling and coordination of care     ________________________________________________________________________  Angela Rodriguez MD     Procedures: see electronic medical records for all procedures/Xrays and details which were not copied into this note but were reviewed prior to creation of Plan. LABS:  I reviewed today's most current labs and imaging studies.   Pertinent labs include:  Recent Labs     02/20/20  0525 02/19/20  0405 02/18/20 1848   WBC 9.2 9.9 13.4*   HGB 10.9* 10.8* 11.4*   HCT 34.8* 34.2* 36.1   PLT 13* 12* 12*     Recent Labs     02/20/20  0525 02/19/20  0405 02/18/20  1849 02/18/20  1848    138  --  136   K 3.7 3.8  --  4.0    108  --  105   CO2 24 24  --  24   GLU 99 110*  --  109*   BUN 20 22*  --  27*   CREA 1.32* 1.38*  --  1.58*   CA 8.5 9.0  --  9.1   ALB 2.6*  --   --  2.9*   TBILI 0.4  --   --  0.6   SGOT 11*  --   --  13*   ALT 9*  --   --  10*   INR  --   --  1.1  --        Signed: Angela Rodriguez MD

## 2020-02-20 NOTE — H&P
Radiology History and Physical    Patient: Sabrina Cagle 81 y.o. female       Chief Complaint: Abnormal Lab Results (Pt sent for low platelet count)      History of Present Illness: ct guided bone marrow aspirate and biopsy    History:    Past Medical History:   Diagnosis Date    Arthritis     Diabetes ([facility] [address]     Hypertension      Family History   Problem Relation Age of Onset    Parkinson's Disease Neg Hx     Seizures Neg Hx     Huntingtons disease Neg Hx      Social History     Socioeconomic History    Marital status:      Spouse name: Not on file    Number of children: Not on file    Years of education: Not on file    Highest education level: Not on file   Occupational History    Not on file   Social Needs    Financial resource strain: Not on file    Food insecurity:     Worry: Not on file     Inability: Not on file    Transportation needs:     Medical: Not on file     Non-medical: Not on file   Tobacco Use    Smoking status: Never Smoker   Substance and Sexual Activity    Alcohol use: No    Drug use: No    Sexual activity: Not Currently   Lifestyle    Physical activity:     Days per week: Not on file     Minutes per session: Not on file    Stress: Not on file   Relationships    Social connections:     Talks on phone: Not on file     Gets together: Not on file     Attends [employer] service: Not on file     Active member of club or organization: Not on file     Attends meetings of clubs or organizations: Not on file     Relationship status: Not on file    Intimate partner violence:     Fear of current or ex partner: Not on file     Emotionally abused: Not on file     Physically abused: Not on file     Forced sexual activity: Not on file   Other Topics Concern    Not on file   Social History Narrative    Not on file       Allergies:    Allergies   Allergen Reactions    Sulfa (Sulfonamide Antibiotics) Hives       Current Medications:  Current Facility-Administered Medications Medication Dose Route Frequency    [START ON 2/21/2020] dexAMETHasone (DECADRON) tablet 40 mg  40 mg Oral DAILY    0.9% sodium chloride infusion 250 mL  250 mL IntraVENous PRN    acetaminophen (TYLENOL) tablet 650 mg  650 mg Oral Q6H PRN    ondansetron (ZOFRAN) injection 4 mg  4 mg IntraVENous Q4H PRN    cholecalciferol (VITAMIN D3) (1000 Units /25 mcg) tablet 1 Tab  1,000 Units Oral DAILY    sodium chloride (NS) flush 5-40 mL  5-40 mL IntraVENous Q8H    sodium chloride (NS) flush 5-40 mL  5-40 mL IntraVENous PRN    acetaminophen (TYLENOL) solution 650 mg  650 mg Oral Q4H PRN    docusate sodium (COLACE) capsule 100 mg  100 mg Oral BID    cefTRIAXone (ROCEPHIN) 1 g in 0.9% sodium chloride (MBP/ADV) 50 mL  1 g IntraVENous Q24H        Physical Exam:  Blood pressure 149/74, pulse 86, temperature 97.7 °F (36.5 °C), resp. rate 18, height 5' 2\" (1.575 m), weight 81.6 kg (180 lb), SpO2 98 %, not currently breastfeeding. LUNG: clear to auscultation bilaterally, HEART: regularly irregular rhythm      Alerts:    Hospital Problems  Date Reviewed: 2/11/2017          Codes Class Noted POA    Thrombocytopenia ([facility] ICD-10-CM: D69.6  ICD-9-CM: 287.5  2/18/2020 Unknown        Sepsis ([facility] ICD-10-CM: A41.9  ICD-9-CM: 038.9, 995.91  2/18/2020 Unknown        Acute encephalopathy ICD-10-CM: G93.40  ICD-9-CM: 348.30  2/18/2020 Unknown        Complicated UTI (urinary tract infection) ICD-10-CM: N39.0  ICD-9-CM: 599.0  2/18/2020 Unknown              Laboratory:      Recent Labs     02/20/20  0525  02/18/20  1849   HGB 10.9*   < >  --    HCT 34.8*   < >  --    WBC 9.2   < >  --    PLT 13*   < >  --    INR  --   --  1.1   BUN 20   < >  --    CREA 1.32*   < >  --    K 3.7   < >  --     < > = values in this interval not displayed. Plan of Care/Planned Procedure:  Risks, benefits, and alternatives reviewed with patient and she agrees to proceed with the procedure.        Elvia Morales MD

## 2020-02-20 NOTE — PROGRESS NOTES
2001 [facility]  at [address] [place] [address], [address] [facility] [place]  [phone]         Hematology Consultation        Patient: Don Powers MRN: [id number]  SSN: [id number]    YOB: 1935  Age: 81 y.o. Sex: female        Subjective:      Don Powers is a 81 y.o. female who I am seeing for severe thrombocytopenia. She underwent routine laboratory studies with PCP. PCP called her back with the results. She was asked to head to the hospital due to severe thrombocytopenia. She has been experiencing nose bleed, bleeding from the mouth and small petechial hemorrhages for 2-3 weeks. She denies recent fever or change in medicine. Interval History:    Ms. Laura Saeed is scheduled for a bone marrow today. Family aware we will start Dexamethasone after bone marrow biopsy today. Review of Systems:    Constitutional: negative  Eyes: negative  Ears, Nose, Mouth, Throat, and Face: bleeding from the mouth and gums  Respiratory: negative  Cardiovascular: negative  Gastrointestinal: negative  Genitourinary:negative  Integument/Breast: petechaie  Hematologic/Lymphatic: negative  Musculoskeletal:negative  Neurological: negative      Past Medical History:   Diagnosis Date    Arthritis     Diabetes ([address]     Hypertension      Past Surgical History:   Procedure Laterality Date    HX ORTHOPAEDIC        Family History   Problem Relation Age of Onset    Parkinson's Disease Neg Hx     Seizures Neg Hx     Huntingtons disease Neg Hx      Social History     Tobacco Use    Smoking status: Never Smoker   Substance Use Topics    Alcohol use: No      Prior to Admission medications    Medication Sig Start Date End Date Taking? Authorizing Provider   losartan (COZAAR) 50 mg tablet Take  by mouth daily. Provider, Historical   sertraline (ZOLOFT) 50 mg tablet Take  by mouth daily.     Provider, Historical   timolol (TIMOPTIC) 0.5 % ophthalmic solution 1 Drop as needed. Provider, Historical   nystatin (MYCOSTATIN) powder Apply  to affected area as needed. Provider, Historical   oxyCODONE-acetaminophen (PERCOCET) 5-325 mg per tablet Take 1 Tab by mouth every four (4) hours as needed for Pain. Provider, Historical   aspirin delayed-release 81 mg tablet Take  by mouth daily. Provider, Historical   cholecalciferol (VITAMIN D3) 1,000 unit cap Take  by mouth daily. Provider, Historical   diphenhydrAMINE (BENADRYL) 25 mg tablet Take 25 mg by mouth as needed for Sleep. Provider, Historical              Allergies   Allergen Reactions    Sulfa (Sulfonamide Antibiotics) Hives           Objective:     Vitals:    02/20/20 1211 02/20/20 1216 02/20/20 1221 02/20/20 1226   BP: (!) 144/105 (!) 162/95 (!) 174/104 (!) 188/127   Pulse: 87 94 92 93   Resp: 15 19 12    Temp:       SpO2: 98% 95% 98%    Weight:       Height:                Physical Exam:    GENERAL: alert, cooperative, no distress, appears stated age  EYE: conjunctivae/corneas clear. PERRL, EOM's intact. LYMPHATIC: Cervical, supraclavicular, and axillary nodes normal.   THROAT & NECK: normal and no erythema or exudates noted. LUNG: clear to auscultation bilaterally  HEART: regular rate and rhythm, S1, S2 normal, no murmur, click, rub or gallop  ABDOMEN: soft, non-tender. Bowel sounds normal. No masses,  no organomegaly  EXTREMITIES:  extremities normal, atraumatic, no cyanosis or edema  SKIN: petechaie on the legs  NEUROLOGIC: AOx3. Gait normal. Reflexes and motor strength normal and symmetric. Cranial nerves 2-12 and sensation grossly intact. CT Results (most recent):  Results from Hospital Encounter encounter on 02/18/20   CT ABD PELV WO CONT    Narrative EXAM: CT ABD PELV WO CONT    INDICATION: abd distention, AMS    COMPARISON: None    CONTRAST:  None. TECHNIQUE:   Thin axial images were obtained through the abdomen and pelvis.  Coronal and  sagittal reconstructions were generated. Oral contrast was not administered. CT  dose reduction was achieved through use of a standardized protocol tailored for  this examination and automatic exposure control for dose modulation. The absence of intravenous contrast material reduces the sensitivity for  evaluation of the solid parenchymal organs of the abdomen. FINDINGS:   LUNG BASES: Clear. INCIDENTALLY IMAGED HEART AND MEDIASTINUM: Coronary atherosclerotic  calcifications are present. LIVER: There is a 4.0 cm cyst in the right hepatic lobe. No biliary dilatation. GALLBLADDER: Unremarkable. SPLEEN: No mass. PANCREAS: No mass or ductal dilatation. ADRENALS: Unremarkable. KIDNEYS/URETERS: There is a 2.2 centers cyst in the anterior mid right kidney. There is a 2.0 cm cyst in the mid right kidney. There is a 1.9 cm cyst in the  inferior pole the right kidney. There was a 1.8 cm cyst in the posterior left  kidney. There is a 1.0 cm cyst in the anterior left kidney. Several small  lesions in the inferior pole left kidney are too small to fully characterize,  but likely represent small cysts as well. No hydronephrosis. No nephrolithiasis. STOMACH: Unremarkable. SMALL BOWEL: No dilatation or wall thickening. COLON: No dilatation or wall thickening. Several diverticula are present. Status  post right hemicolectomy. APPENDIX: Surgically absent. PERITONEUM: No ascites or pneumoperitoneum. RETROPERITONEUM: No lymphadenopathy or aortic aneurysm. REPRODUCTIVE ORGANS: Status post hysterectomy. URINARY BLADDER: No mass or calculus. BONES: No destructive bone lesion. There is a chronic appearing moderate  compression deformity of L3.  ADDITIONAL COMMENTS: N/A      Impression IMPRESSION:  No evidence of acute process. Incidentals as above. I personally reviewed the images.  No organomegaly      LABS:     Lab Results   Component Value Date/Time    WBC 9.2 02/20/2020 05:25 AM    HGB 10.9 (L) 02/20/2020 05:25 AM    HCT 34.8 (L) 02/20/2020 05:25 AM    PLATELET 13 (LL) 33/93/1912 05:25 AM    MCV 82.3 02/20/2020 05:25 AM            Assessment:     1. Severe thrombocytopenia    Platelets today 13    Two possible etiologies are bone marrow disorders such as MDS or cITP. Due to advanced age, I shall obtain a bone marrow in AM    Then I would treat her empirically with oral Dex 40 mg daily for 4 days  May consider adding IVIG      Plan:       1. Bone marrow biopsy today  2. 1 unit of platelet transfusion prior to bone marrow Bx  3. Plan to start Dex 40 mg daily for 4 days after bone marrow biopsy.       Signed by: Trinity Dennis NP                     February 20, 2020

## 2020-02-20 NOTE — PROGRESS NOTES
Problem: Falls - Risk of  Goal: *Absence of Falls  Description  Document Ananth President Fall Risk and appropriate interventions in the flowsheet.   Outcome: Progressing Towards Goal  Note: Fall Risk Interventions:  Mobility Interventions: Patient to call before getting OOB    Mentation Interventions: Door open when patient unattended, Eyeglasses and hearing aids, Reorient patient, Adequate sleep, hydration, pain control, Bed/chair exit alarm    Medication Interventions: Patient to call before getting OOB, Evaluate medications/consider consulting pharmacy, Teach patient to arise slowly    Elimination Interventions: Bed/chair exit alarm, Call light in reach, Patient to call for help with toileting needs

## 2020-02-20 NOTE — PROGRESS NOTES
1110 - Pt brought down to department by IR nurses (Lena hatfield) into department for bone marrow biopsy, A/O x4 - platelets hanging. Dr. Baltazar Medina Present for pre procedure consult and consent - questions reviewed with understanding - consent signed. Discharge instructions reviewed, will review again at discharge w/pt and floor nurse. 1330 - TRANSFER - OUT REPORT:    bedside report given to Penelope Rosa pt's nurse on [place] [facility]  being transferred to room #3594 for ordered procedure - CT guided bone marrow biopsy w/conscious sedation which pt tolerated well w/4mg Versed and 75 mcg Fentanyl - Platelets completed as CT began scan. Cytology present at bone marrow biopsy. Report consisted of patients Situation, Background, Assessment and   Recommendations(SBAR). Information from the following report(s) Procedure Summary was reviewed with the receiving nurse. Opportunity for questions and clarification was provided.       Patient transported to room by two IR RN, Viridiana Foster and Brayan Abernathy

## 2020-02-21 PROBLEM — D69.3 CHRONIC ITP (IDIOPATHIC THROMBOCYTOPENIA) (HCC): Status: ACTIVE | Noted: 2020-02-21

## 2020-02-21 LAB
ANION GAP SERPL CALC-SCNC: 6 MMOL/L (ref 5–15)
BLD PROD TYP BPU: NORMAL
BPU ID: NORMAL
BUN SERPL-MCNC: 16 MG/DL (ref 6–20)
BUN/CREAT SERPL: 14 (ref 12–20)
CALCIUM SERPL-MCNC: 8.3 MG/DL (ref 8.5–10.1)
CHLORIDE SERPL-SCNC: 109 MMOL/L (ref 97–108)
CO2 SERPL-SCNC: 26 MMOL/L (ref 21–32)
CREAT SERPL-MCNC: 1.17 MG/DL (ref 0.55–1.02)
ERYTHROCYTE [DISTWIDTH] IN BLOOD BY AUTOMATED COUNT: 13.8 % (ref 11.5–14.5)
GLUCOSE SERPL-MCNC: 113 MG/DL (ref 65–100)
HCT VFR BLD AUTO: 32.2 % (ref 35–47)
HGB BLD-MCNC: 10.3 G/DL (ref 11.5–16)
MCH RBC QN AUTO: 26 PG (ref 26–34)
MCHC RBC AUTO-ENTMCNC: 32 G/DL (ref 30–36.5)
MCV RBC AUTO: 81.3 FL (ref 80–99)
NRBC # BLD: 0 K/UL (ref 0–0.01)
NRBC BLD-RTO: 0 PER 100 WBC
PLATELET # BLD AUTO: 19 K/UL (ref 150–400)
PMV BLD AUTO: 12.2 FL (ref 8.9–12.9)
POTASSIUM SERPL-SCNC: 3.6 MMOL/L (ref 3.5–5.1)
RBC # BLD AUTO: 3.96 M/UL (ref 3.8–5.2)
SODIUM SERPL-SCNC: 141 MMOL/L (ref 136–145)
STATUS OF UNIT,%ST: NORMAL
UNIT DIVISION, %UDIV: 0
WBC # BLD AUTO: 10 K/UL (ref 3.6–11)

## 2020-02-21 PROCEDURE — 80048 BASIC METABOLIC PNL TOTAL CA: CPT

## 2020-02-21 PROCEDURE — 74011000258 HC RX REV CODE- 258: Performed by: INTERNAL MEDICINE

## 2020-02-21 PROCEDURE — 36415 COLL VENOUS BLD VENIPUNCTURE: CPT

## 2020-02-21 PROCEDURE — 74011250636 HC RX REV CODE- 250/636: Performed by: INTERNAL MEDICINE

## 2020-02-21 PROCEDURE — 65270000029 HC RM PRIVATE

## 2020-02-21 PROCEDURE — 85027 COMPLETE CBC AUTOMATED: CPT

## 2020-02-21 PROCEDURE — 74011250637 HC RX REV CODE- 250/637: Performed by: INTERNAL MEDICINE

## 2020-02-21 PROCEDURE — 97530 THERAPEUTIC ACTIVITIES: CPT | Performed by: PHYSICAL THERAPIST

## 2020-02-21 PROCEDURE — 74011250636 HC RX REV CODE- 250/636: Performed by: NURSE PRACTITIONER

## 2020-02-21 PROCEDURE — 97116 GAIT TRAINING THERAPY: CPT | Performed by: PHYSICAL THERAPIST

## 2020-02-21 RX ADMIN — MICONAZOLE NITRATE: 20 CREAM TOPICAL at 18:04

## 2020-02-21 RX ADMIN — Medication 10 ML: at 14:42

## 2020-02-21 RX ADMIN — CEFTRIAXONE SODIUM 1 G: 1 INJECTION, POWDER, FOR SOLUTION INTRAMUSCULAR; INTRAVENOUS at 22:56

## 2020-02-21 RX ADMIN — DOCUSATE SODIUM 100 MG: 100 CAPSULE, LIQUID FILLED ORAL at 09:58

## 2020-02-21 RX ADMIN — DEXAMETHASONE 40 MG: 4 TABLET ORAL at 15:19

## 2020-02-21 RX ADMIN — MICONAZOLE NITRATE: 20 CREAM TOPICAL at 09:36

## 2020-02-21 RX ADMIN — VITAMIN D, TAB 1000IU (100/BT) 1 TABLET: 25 TAB at 09:58

## 2020-02-21 RX ADMIN — Medication 10 ML: at 22:56

## 2020-02-21 RX ADMIN — Medication 10 ML: at 06:41

## 2020-02-21 NOTE — PROGRESS NOTES
Transition of Care Plan:        Home -son declined SNF, HHC, and H2H as she will transition to [address] [place] [address] in March  MD to complete DDNR prior to d/c. Form on chart  Follow-up Appointments  2nd IM Letter   MD anticipates Monday d/c if platelets rebound.

## 2020-02-21 NOTE — PROGRESS NOTES
Hospitalist Progress Note    NAME: Sabrina Cagle   :  1935   MRN:  [id number]       Assessment / Plan:  Acute thrombocytopenia   Platelet count 23,588 only   Peripheral blood smear does not have schistocytes as.  ED doctor since he talked with the lab  No anemia, active bleeding  CT head did not reveal intracranial bleed   On aspirin at home, will hold  Hematology was consulted stat from the ER, they do not think this is TTP and recommended fibrinogen, haptoglobin, LDH which was sent  Follow CBC daily  Hold all anticoagulation including DVT prophylaxis     :  Plt remains stable, but still low. Hematology eval on going. Pt denies any current evidence of mucosal bleeding. Continue holding ASA, monitor CBC    :  Pt for BM biopsy this morning  Appreciate Hematology evals  Plt 13k, no indication to transfuse Plt currently, continue holding ASA and monitor CBC    :  Plt improving slowly, now 19k. Pathology from bone marrow biopsy is pending. Complicated UTI   Suprapubic pain, pyuria and large leukocyte esterase  Was started on ceftriaxone in the ER, will continue  Follow-up with urine cultures  CT abdomen did not reveal any pyelonephritis or hydronephrosis    :  Continue with IV abx - follow up UCx.    :  UCx was never sent -?why  Complete 7 day course of abx    Acute kidney injury - improving  Likely prerenal from dehydration   On losartan at home, will hold   IV fluids   Follow-up BMP daily     :  Cr improving already, continue with IVF    :  Cr continues to improve    Hypertension  Controlled  On losartan at home, will hold due to NICK   Start hydralazine as needed for now and resume losartan once creatinine back to baseline    30.0 - 39.9 Obese / Body mass index is 32.92 kg/m². Code status: DNR  Prophylaxis: SCD's  Recommended Disposition:  PT, OT, RN     Subjective:     Chief Complaint / Reason for Physician Visit  Feels well this morning. Discussed with RN events overnight. Review of Systems:  Symptom Y/N Comments  Symptom Y/N Comments   Fever/Chills n   Chest Pain n    Poor Appetite    Edema     Cough n   Abdominal Pain n    Sputum    Joint Pain     SOB/ROGEL n   Pruritis/Rash     Nausea/vomit    Tolerating PT/OT y    Diarrhea    Tolerating Diet y    Constipation    Other       Could NOT obtain due to:      Objective:     VITALS:   Last 24hrs VS reviewed since prior progress note. Most recent are:  Patient Vitals for the past 24 hrs:   Temp Pulse Resp BP SpO2   02/21/20 0804 98.2 °F (36.8 °C) 90 18 176/86 96 %   02/21/20 0035 98 °F (36.7 °C) 87 18 131/85 96 %   02/20/20 1545 97.7 °F (36.5 °C) 86 18 149/74 98 %   02/20/20 1430  90 18 137/61 93 %   02/20/20 1400  89 20 141/56 93 %   02/20/20 1330  78 20 154/68 98 %   02/20/20 1315  76 20 156/64 97 %   02/20/20 1300  75 16 155/82 100 %   02/20/20 1245  84 16 153/60 99 %   02/20/20 1236  88 16 163/64 95 %   02/20/20 1231  98 20 172/88    02/20/20 1226  93 20 (!) 188/127    02/20/20 1221  92 12 (!) 174/104 98 %   02/20/20 1216  94 19 (!) 162/95 95 %   02/20/20 1211  87 15 (!) 144/105 98 %   02/20/20 1209  85 12 (!) 165/96 100 %   02/20/20 1206  89 10 (!) 179/126 100 %   02/20/20 1201  94 11 (!) 190/168 98 %   02/20/20 1156 98.3 °F (36.8 °C) 89 10 184/89 100 %   02/20/20 1135 98.1 °F (36.7 °C) 84 16 144/70    02/20/20 1051 98.9 °F (37.2 °C) 82 16 140/78 90 %       Intake/Output Summary (Last 24 hours) at 2/21/2020 7838  Last data filed at 2/20/2020 1954  Gross per 24 hour   Intake 353.3 ml   Output    Net 353.3 ml        PHYSICAL EXAM:  General: WD, WN. Alert, cooperative, no acute distress    EENT:  EOMI. Anicteric sclerae. MMM  Resp:  CTA bilaterally, no wheezing or rales.   No accessory muscle use  CV:  Regular  rhythm,  No edema  GI:  Soft, Non distended, Non tender.  +Bowel sounds  Neurologic:  Alert and oriented X 3, normal speech,   Psych:   Good insight. Not anxious nor agitated  Skin:  No rashes. No jaundice    Reviewed most current lab test results and cultures  YES  Reviewed most current radiology test results   YES  Review and summation of old records today    NO  Reviewed patient's current orders and MAR    YES  PMH/SH reviewed - no change compared to H&P  ________________________________________________________________________  Care Plan discussed with:    Comments   Patient x    Family      RN x    Care Manager     Consultant                        Multidiciplinary team rounds were held today with , nursing, pharmacist and clinical coordinator. Patient's plan of care was discussed; medications were reviewed and discharge planning was addressed. ________________________________________________________________________  Total NON critical care TIME:  35   Minutes    Total CRITICAL CARE TIME Spent:   Minutes non procedure based      Comments   >50% of visit spent in counseling and coordination of care     ________________________________________________________________________  Tano Dickinson MD     Procedures: see electronic medical records for all procedures/Xrays and details which were not copied into this note but were reviewed prior to creation of Plan. LABS:  I reviewed today's most current labs and imaging studies.   Pertinent labs include:  Recent Labs     02/21/20  0044 02/20/20  0525 02/19/20  0405   WBC 10.0 9.2 9.9   HGB 10.3* 10.9* 10.8*   HCT 32.2* 34.8* 34.2*   PLT 19* 13* 12*     Recent Labs     02/21/20  0044 02/20/20  0525 02/19/20  0405 02/18/20 1849 02/18/20 1848    140 138  --  136   K 3.6 3.7 3.8  --  4.0   * 108 108  --  105   CO2 26 24 24  --  24   * 99 110*  --  109*   BUN 16 20 22*  --  27*   CREA 1.17* 1.32* 1.38*  --  1.58*   CA 8.3* 8.5 9.0  --  9.1   ALB  --  2.6*  --   --  2.9*   TBILI  --  0.4  --   --  0.6   SGOT  --  11*  --   --  13*   ALT  --  9*  --   --  10*   INR  --   --   --  1.1  -- Signed: Tano Dickinson MD

## 2020-02-21 NOTE — PROGRESS NOTES
Problem: Mobility Impaired (Adult and Pediatric)  Goal: *Acute Goals and Plan of Care (Insert Text)  Description  FUNCTIONAL STATUS PRIOR TO ADMISSION: Patient initially reporting modified independent with all mobility but then stated daughter-in-law assists with all mobility and ADLs    HOME SUPPORT PRIOR TO ADMISSION: The patient lived with son and daughter-in law who assist with all mobility and ADLs. Physical Therapy Goals  Initiated 2/19/2020  1. Patient will move from supine to sit and sit to supine , scoot up and down and roll side to side in bed with minimal assistance/contact guard assist within 7 day(s). 2.  Patient will transfer from bed to chair and chair to bed with minimal assistance/contact guard assist using the least restrictive device within 7 day(s). 3.  Patient will perform sit to stand with minimal assistance/contact guard assist within 7 day(s). 4.  Patient will ambulate with minimal assistance/contact guard assist for 75 feet with the least restrictive device within 7 day(s). 5.  Patient will ascend/descend 2 stairs with 1 handrail(s) with minimal assistance/contact guard assist within 7 day(s). Outcome: Progressing Towards Goal   PHYSICAL THERAPY TREATMENT  Patient: Shantell Kumar (52 y.o. female)  Date: 2/21/2020  Diagnosis: Sepsis ([facility] [address] [A41.9]  Thrombocytopenia ([address] [M88.1]  Complicated UTI (urinary tract infection) [N39.0]  Acute encephalopathy [G93.40]   <principal problem not specified>       Precautions:  falls  Chart, physical therapy assessment, plan of care and goals were reviewed. ASSESSMENT  Patient continues with skilled PT services and is progressing towards goals. Patient requiring CGA and additional time to complete transfers and ambulation today. Patient able to ambulate a total of 30 feet with increased encouragement.  Patient continues to display self limiting behaviors requiring increased encouragement for minimal active participation which limits her independence. Recommend SNF following discharge to maximize functional mobility and endurance. Current Level of Function Impacting Discharge (mobility/balance): CGA and additional time    Other factors to consider for discharge: sedentary with self-limiting behaviors         PLAN :  Patient continues to benefit from skilled intervention to address the above impairments. Continue treatment per established plan of care. to address goals. Recommendation for discharge: (in order for the patient to meet his/her long term goals)  Therapy up to 5 days/week in SNF setting    This discharge recommendation:  A follow-up discussion with the attending provider and/or case management is planned    IF patient discharges home will need the following DME: patient owns DME required for discharge       SUBJECTIVE:   Patient stated That's it. I'm not doing any more.     OBJECTIVE DATA SUMMARY:   Critical Behavior:  Neurologic State: Alert  Orientation Level: Oriented X4  Cognition: Follows commands, Appropriate decision making     Functional Mobility Training:  Bed Mobility:      Deferred; patient in bathroom with nursing students upon arrival              Transfers:  Sit to Stand: Contact guard assistance; Additional time  Stand to Sit: Contact guard assistance; Additional time                             Balance:  Sitting: Impaired  Sitting - Static: Good (unsupported)  Sitting - Dynamic: Fair (occasional)  Standing: Impaired  Standing - Static: Fair;Constant support  Standing - Dynamic : Fair;Constant support(using RW for support)  Ambulation/Gait Training:  Distance (ft): 30 Feet (ft)(10 feet then 20 feet wtih extended sitting rest)  Assistive Device: Walker, rolling;Gait belt  Ambulation - Level of Assistance: Contact guard assistance        Gait Abnormalities: Decreased step clearance        Base of Support: Widened     Speed/Mallory: Pace decreased (<100 feet/min); Shuffled; Slow  Step Length: Left shortened;Right shortened      Gait is slow and shuffled; mildly unsteady but no overt LOB noted          Activity Tolerance:   Poor and requires frequent rest breaks  Please refer to the flowsheet for vital signs taken during this treatment.     After treatment patient left in no apparent distress:   Sitting in chair and Call bell within reach; legs elevated    COMMUNICATION/COLLABORATION:   The patients plan of care was discussed with: Registered Nurse    Dino Acosta, PT   Time Calculation: 29 mins

## 2020-02-21 NOTE — PROGRESS NOTES
[facility]  [address], [address] [facility] [place], [address]  [phone]         Hematology Follow up        Patient: Susie Hough MRN: [id number]  SSN: [id number]    YOB: 1935  Age: 81 y.o. Sex: female        Subjective:      Susie Hough is a 81 y.o. female who I am seeing in follow up for severe thrombocytopenia. She underwent routine laboratory studies with PCP. PCP called her back with the results. She was asked to head to the hospital due to severe thrombocytopenia. She has been experiencing nose bleed, bleeding from the mouth and small petechial hemorrhages for 2-3 weeks. She denies recent fever or change in medicine. She had a bone marrow biopsy. She is doing well. No new bleeding site. Review of Systems:    Constitutional: negative  Eyes: negative  Ears, Nose, Mouth, Throat, and Face: negative  Respiratory: negative  Cardiovascular: negative  Gastrointestinal: negative  Genitourinary:negative  Integument/Breast: petechaie  Hematologic/Lymphatic: negative  Musculoskeletal:negative  Neurological: negative      Past Medical History:   Diagnosis Date    Arthritis     Diabetes ([address]     Hypertension      Past Surgical History:   Procedure Laterality Date    HX ORTHOPAEDIC        Family History   Problem Relation Age of Onset    Parkinson's Disease Neg Hx     Seizures Neg Hx     Huntingtons disease Neg Hx      Social History     Tobacco Use    Smoking status: Never Smoker   Substance Use Topics    Alcohol use: No      Prior to Admission medications    Medication Sig Start Date End Date Taking? Authorizing Provider   losartan (COZAAR) 50 mg tablet Take  by mouth daily. Provider, Historical   sertraline (ZOLOFT) 50 mg tablet Take  by mouth daily. Provider, Historical   timolol (TIMOPTIC) 0.5 % ophthalmic solution 1 Drop as needed.     Provider, Historical   nystatin (MYCOSTATIN) powder Apply  to affected area as needed. Provider, Historical   oxyCODONE-acetaminophen (PERCOCET) 5-325 mg per tablet Take 1 Tab by mouth every four (4) hours as needed for Pain. Provider, Historical   aspirin delayed-release 81 mg tablet Take  by mouth daily. Provider, Historical   cholecalciferol (VITAMIN D3) 1,000 unit cap Take  by mouth daily. Provider, Historical   diphenhydrAMINE (BENADRYL) 25 mg tablet Take 25 mg by mouth as needed for Sleep. Provider, Historical              Allergies   Allergen Reactions    Sulfa (Sulfonamide Antibiotics) Hives           Objective:     Vitals:    02/21/20 0035 02/21/20 0804 02/21/20 1107 02/21/20 1533   BP: 131/85 176/86 138/78 143/59   Pulse: 87 90 95 86   Resp: 18 18 16 16   Temp: 98 °F (36.7 °C) 98.2 °F (36.8 °C) 98.3 °F (36.8 °C) 98.3 °F (36.8 °C)   SpO2: 96% 96% 95% 97%   Weight:       Height:                Physical Exam:    GENERAL: alert, cooperative, no distress, appears stated age  EYE: conjunctivae/corneas clear. PERRL, EOM's intact. LYMPHATIC: Cervical, supraclavicular, and axillary nodes normal.   THROAT & NECK: normal and no erythema or exudates noted. LUNG: clear to auscultation bilaterally  HEART: regular rate and rhythm, S1, S2 normal, no murmur, click, rub or gallop  ABDOMEN: soft, non-tender. Bowel sounds normal. No masses,  no organomegaly  EXTREMITIES:  extremities normal, atraumatic, no cyanosis or edema  SKIN: petechaie on the legs  NEUROLOGIC: AOx3. Gait normal. Reflexes and motor strength normal and symmetric. Cranial nerves 2-12 and sensation grossly intact. CT Results (most recent):  Results from Hospital Encounter encounter on 02/18/20   CT BX BONE MARROW DIAGNOSTIC    Narrative Clinical Indication:  thrombocytopenia      Using CT guidance, after adequate skin preparation and local anesthesia with a  posterior approach, a 14-gauge bone biopsy needle was placed into the posterior  aspect of the iliac bone.   Bone marrow aspirate and biopsy were performed , patient tolerated the procedure  well. Sample appears adequate. CT dose reduction was achieved through use of a  standardized protocol tailored for this examination and automatic exposure  control for dose modulation. Drill technique        Impression Impression: CT-guided bone marrow biopsy and aspirate. Intravenous sedation  performed with 2mg Versed 50 mcg of fentanyl with pulse oximetry and nursing  assistance provided for monitoring. .     I personally reviewed the images. No organomegaly      LABS:     Lab Results   Component Value Date/Time    WBC 10.0 02/21/2020 12:44 AM    HGB 10.3 (L) 02/21/2020 12:44 AM    HCT 32.2 (L) 02/21/2020 12:44 AM    PLATELET 19 (LL) 66/89/4658 12:44 AM    MCV 81.3 02/21/2020 12:44 AM            Assessment:     1. Chronic ITP    On high dose Dex for 4 days  Platelet count is rising slowly  If the platelet count is even higher tomorrow, she could be discharged  I will see her with repeat CBC in the infusion center next week      Plan:       1. If the platelet count is even higher tomorrow, she could be discharged.  I will see her with repeat CBC in the infusion center next week      Signed by: Adrian Manuel MD                     February 21, 2020

## 2020-02-21 NOTE — PROGRESS NOTES
Bedside and Verbal shift change report given to [place] (oncoming nurse) by Loida Gil (offgoing nurse). Report included the following information SBAR, Kardex, Intake/Output and MAR.

## 2020-02-22 LAB
BASOPHILS # BLD: 0 K/UL (ref 0–0.1)
BASOPHILS NFR BLD: 1 % (ref 0–1)
DIFFERENTIAL METHOD BLD: ABNORMAL
EOSINOPHIL # BLD: 0 K/UL (ref 0–0.4)
EOSINOPHIL NFR BLD: 0 % (ref 0–7)
ERYTHROCYTE [DISTWIDTH] IN BLOOD BY AUTOMATED COUNT: 13.8 % (ref 11.5–14.5)
HCT VFR BLD AUTO: 37.2 % (ref 35–47)
HGB BLD-MCNC: 11.8 G/DL (ref 11.5–16)
IMM GRANULOCYTES # BLD AUTO: 0.1 K/UL (ref 0–0.04)
IMM GRANULOCYTES NFR BLD AUTO: 1 % (ref 0–0.5)
LYMPHOCYTES # BLD: 1.2 K/UL (ref 0.8–3.5)
LYMPHOCYTES NFR BLD: 16 % (ref 12–49)
MCH RBC QN AUTO: 25.5 PG (ref 26–34)
MCHC RBC AUTO-ENTMCNC: 31.7 G/DL (ref 30–36.5)
MCV RBC AUTO: 80.5 FL (ref 80–99)
MONOCYTES # BLD: 0.1 K/UL (ref 0–1)
MONOCYTES NFR BLD: 1 % (ref 5–13)
NEUTS SEG # BLD: 6.2 K/UL (ref 1.8–8)
NEUTS SEG NFR BLD: 81 % (ref 32–75)
NRBC # BLD: 0 K/UL (ref 0–0.01)
NRBC BLD-RTO: 0 PER 100 WBC
PLATELET # BLD AUTO: 55 K/UL (ref 150–400)
PMV BLD AUTO: 12.3 FL (ref 8.9–12.9)
RBC # BLD AUTO: 4.62 M/UL (ref 3.8–5.2)
WBC # BLD AUTO: 7.7 K/UL (ref 3.6–11)

## 2020-02-22 PROCEDURE — 74011250637 HC RX REV CODE- 250/637: Performed by: INTERNAL MEDICINE

## 2020-02-22 PROCEDURE — 74011000258 HC RX REV CODE- 258: Performed by: INTERNAL MEDICINE

## 2020-02-22 PROCEDURE — 74011250636 HC RX REV CODE- 250/636: Performed by: INTERNAL MEDICINE

## 2020-02-22 PROCEDURE — 65270000029 HC RM PRIVATE

## 2020-02-22 PROCEDURE — 74011250636 HC RX REV CODE- 250/636: Performed by: NURSE PRACTITIONER

## 2020-02-22 PROCEDURE — 85025 COMPLETE CBC W/AUTO DIFF WBC: CPT

## 2020-02-22 PROCEDURE — 36415 COLL VENOUS BLD VENIPUNCTURE: CPT

## 2020-02-22 RX ADMIN — Medication 10 ML: at 21:46

## 2020-02-22 RX ADMIN — MICONAZOLE NITRATE: 20 CREAM TOPICAL at 18:37

## 2020-02-22 RX ADMIN — Medication 10 ML: at 14:25

## 2020-02-22 RX ADMIN — DEXAMETHASONE 40 MG: 4 TABLET ORAL at 09:41

## 2020-02-22 RX ADMIN — MICONAZOLE NITRATE: 20 CREAM TOPICAL at 09:42

## 2020-02-22 RX ADMIN — Medication 10 ML: at 05:53

## 2020-02-22 RX ADMIN — VITAMIN D, TAB 1000IU (100/BT) 1 TABLET: 25 TAB at 09:42

## 2020-02-22 RX ADMIN — CEFTRIAXONE SODIUM 1 G: 1 INJECTION, POWDER, FOR SOLUTION INTRAMUSCULAR; INTRAVENOUS at 21:47

## 2020-02-22 NOTE — PROGRESS NOTES
Bedside and Verbal shift change report given to [place]  (oncoming nurse) by Florentino Sheehan (offgoing nurse). Report included the following information SBAR, Kardex, Intake/Output and MAR.

## 2020-02-22 NOTE — PROGRESS NOTES
Hospitalist Progress Note    NAME: Marlena Marin   :  1935   MRN:  [id number]       Assessment / Plan:  Acute thrombocytopenia   Platelet count 98,195 only   Peripheral blood smear does not have schistocytes as.  ED doctor since he talked with the lab  No anemia, active bleeding  CT head did not reveal intracranial bleed   On aspirin at home, will hold  Hematology was consulted stat from the ER, they do not think this is TTP and recommended fibrinogen, haptoglobin, LDH which was sent  Follow CBC daily  Hold all anticoagulation including DVT prophylaxis     :  Plt remains stable, but still low. Hematology eval on going. Pt denies any current evidence of mucosal bleeding. Continue holding ASA, monitor CBC    :  Pt for BM biopsy this morning  Appreciate Hematology evals  Plt 13k, no indication to transfuse Plt currently, continue holding ASA and monitor CBC    :  Plt improving slowly, now 19k. Pathology from bone marrow biopsy is pending.    :  Plt count from today is pending  If it continues to rise, will likely dc tomorrow. Complicated UTI   Suprapubic pain, pyuria and large leukocyte esterase  Was started on ceftriaxone in the ER, will continue  Follow-up with urine cultures  CT abdomen did not reveal any pyelonephritis or hydronephrosis    :  Continue with IV abx - follow up UCx.    :  UCx was never sent -?why  Complete 7 day course of abx    Acute kidney injury - improving  Likely prerenal from dehydration   On losartan at home, will hold   IV fluids   Follow-up BMP daily     :  Cr improving already, continue with IVF    :  Cr continues to improve    Hypertension  Controlled  On losartan at home, will hold due to NICK   Start hydralazine as needed for now and resume losartan once creatinine back to baseline    30.0 - 39.9 Obese / Body mass index is 32.92 kg/m².     Code status: DNR  Prophylaxis: SCD's  Recommended Disposition: HH PT, OT, RN Subjective:     Chief Complaint / Reason for Physician Visit  Feels well this morning. Discussed with RN events overnight. Review of Systems:  Symptom Y/N Comments  Symptom Y/N Comments   Fever/Chills n   Chest Pain n    Poor Appetite    Edema     Cough n   Abdominal Pain n    Sputum    Joint Pain     SOB/ROGEL n   Pruritis/Rash     Nausea/vomit    Tolerating PT/OT y    Diarrhea    Tolerating Diet y    Constipation    Other       Could NOT obtain due to:      Objective:     VITALS:   Last 24hrs VS reviewed since prior progress note. Most recent are:  Patient Vitals for the past 24 hrs:   Temp Pulse Resp BP SpO2   02/22/20 0754 97.6 °F (36.4 °C) 86 16 175/78 94 %   02/21/20 2305 97.6 °F (36.4 °C) 88 18 156/83 95 %   02/21/20 1852 98.3 °F (36.8 °C) 91 16 153/59 96 %   02/21/20 1533 98.3 °F (36.8 °C) 86 16 143/59 97 %   02/21/20 1107 98.3 °F (36.8 °C) 95 16 138/78 95 %       Intake/Output Summary (Last 24 hours) at 2/22/2020 0943  Last data filed at 2/21/2020 2256  Gross per 24 hour   Intake 50 ml   Output    Net 50 ml        PHYSICAL EXAM:  General: WD, WN. Alert, cooperative, no acute distress    EENT:  EOMI. Anicteric sclerae. MMM  Resp:  CTA bilaterally, no wheezing or rales. No accessory muscle use  CV:  Regular  rhythm,  No edema  GI:  Soft, Non distended, Non tender.  +Bowel sounds  Neurologic:  Alert and oriented X 3, normal speech,   Psych:   Good insight. Not anxious nor agitated  Skin:  No rashes.   No jaundice    Reviewed most current lab test results and cultures  YES  Reviewed most current radiology test results   YES  Review and summation of old records today    NO  Reviewed patient's current orders and MAR    YES  PMH/SH reviewed - no change compared to H&P  ________________________________________________________________________  Care Plan discussed with:    Comments   Patient x    Family      RN x    Care Manager     Consultant                        Multidiciplinary team rounds were held today with , nursing, pharmacist and clinical coordinator. Patient's plan of care was discussed; medications were reviewed and discharge planning was addressed. ________________________________________________________________________  Total NON critical care TIME:  35   Minutes    Total CRITICAL CARE TIME Spent:   Minutes non procedure based      Comments   >50% of visit spent in counseling and coordination of care     ________________________________________________________________________  Mckay Olsen MD     Procedures: see electronic medical records for all procedures/Xrays and details which were not copied into this note but were reviewed prior to creation of Plan. LABS:  I reviewed today's most current labs and imaging studies.   Pertinent labs include:  Recent Labs     02/21/20 0044 02/20/20  0525   WBC 10.0 9.2   HGB 10.3* 10.9*   HCT 32.2* 34.8*   PLT 19* 13*     Recent Labs     02/21/20 0044 02/20/20  0525    140   K 3.6 3.7   * 108   CO2 26 24   * 99   BUN 16 20   CREA 1.17* 1.32*   CA 8.3* 8.5   ALB  --  2.6*   TBILI  --  0.4   SGOT  --  11*   ALT  --  9*       Signed: Mckay Olsen MD

## 2020-02-23 VITALS
HEART RATE: 73 BPM | SYSTOLIC BLOOD PRESSURE: 138 MMHG | TEMPERATURE: 98.2 F | BODY MASS INDEX: 33.13 KG/M2 | WEIGHT: 180 LBS | RESPIRATION RATE: 14 BRPM | HEIGHT: 62 IN | DIASTOLIC BLOOD PRESSURE: 63 MMHG | OXYGEN SATURATION: 95 %

## 2020-02-23 LAB
BACTERIA SPEC CULT: NORMAL
BASOPHILS # BLD: 0 K/UL (ref 0–0.1)
BASOPHILS NFR BLD: 0 % (ref 0–1)
DIFFERENTIAL METHOD BLD: ABNORMAL
EOSINOPHIL # BLD: 0 K/UL (ref 0–0.4)
EOSINOPHIL NFR BLD: 0 % (ref 0–7)
ERYTHROCYTE [DISTWIDTH] IN BLOOD BY AUTOMATED COUNT: 14 % (ref 11.5–14.5)
HCT VFR BLD AUTO: 33.3 % (ref 35–47)
HGB BLD-MCNC: 10.8 G/DL (ref 11.5–16)
IMM GRANULOCYTES # BLD AUTO: 0.1 K/UL (ref 0–0.04)
IMM GRANULOCYTES NFR BLD AUTO: 1 % (ref 0–0.5)
LYMPHOCYTES # BLD: 1.4 K/UL (ref 0.8–3.5)
LYMPHOCYTES NFR BLD: 12 % (ref 12–49)
MCH RBC QN AUTO: 25.8 PG (ref 26–34)
MCHC RBC AUTO-ENTMCNC: 32.4 G/DL (ref 30–36.5)
MCV RBC AUTO: 79.5 FL (ref 80–99)
MONOCYTES # BLD: 0.5 K/UL (ref 0–1)
MONOCYTES NFR BLD: 4 % (ref 5–13)
NEUTS SEG # BLD: 10 K/UL (ref 1.8–8)
NEUTS SEG NFR BLD: 83 % (ref 32–75)
NRBC # BLD: 0 K/UL (ref 0–0.01)
NRBC BLD-RTO: 0 PER 100 WBC
PLATELET # BLD AUTO: 114 K/UL (ref 150–400)
PMV BLD AUTO: 11.6 FL (ref 8.9–12.9)
RBC # BLD AUTO: 4.19 M/UL (ref 3.8–5.2)
SERVICE CMNT-IMP: NORMAL
WBC # BLD AUTO: 12 K/UL (ref 3.6–11)

## 2020-02-23 PROCEDURE — 36415 COLL VENOUS BLD VENIPUNCTURE: CPT

## 2020-02-23 PROCEDURE — 74011250637 HC RX REV CODE- 250/637: Performed by: INTERNAL MEDICINE

## 2020-02-23 PROCEDURE — 74011250636 HC RX REV CODE- 250/636: Performed by: NURSE PRACTITIONER

## 2020-02-23 PROCEDURE — 85025 COMPLETE CBC W/AUTO DIFF WBC: CPT

## 2020-02-23 RX ORDER — DEXAMETHASONE 4 MG/1
4 TABLET ORAL
Qty: 1 TAB | Refills: 0 | Status: SHIPPED | OUTPATIENT
Start: 2020-02-24

## 2020-02-23 RX ORDER — CEPHALEXIN 500 MG/1
500 CAPSULE ORAL 4 TIMES DAILY
Qty: 4 CAP | Refills: 0 | Status: SHIPPED | OUTPATIENT
Start: 2020-02-23 | End: 2020-02-24

## 2020-02-23 RX ADMIN — VITAMIN D, TAB 1000IU (100/BT) 1 TABLET: 25 TAB at 09:25

## 2020-02-23 RX ADMIN — DEXAMETHASONE 40 MG: 4 TABLET ORAL at 09:25

## 2020-02-23 RX ADMIN — Medication 10 ML: at 14:27

## 2020-02-23 RX ADMIN — MICONAZOLE NITRATE: 20 CREAM TOPICAL at 09:26

## 2020-02-23 RX ADMIN — Medication 10 ML: at 06:03

## 2020-02-23 NOTE — PROGRESS NOTES
Bedside and Verbal shift change report given to [facility], RN (oncoming nurse) by Mega Burden RN (offgoing nurse). Report included the following information SBAR, Kardex, Intake/Output and MAR.

## 2020-02-23 NOTE — PROGRESS NOTES
Discharge instructions, follow up appointments and prescriptions reviewed with patient and patients daughter in law who verbalized an understanding. An opportunity for questions and clarification was provided for.

## 2020-02-23 NOTE — DISCHARGE SUMMARY
Hospitalist Discharge Summary     Patient ID:  Cristiana Whitaker  [id number]  81 y.o.  1935 2/18/2020    PCP on record: Leopoldo Awan MD    Admit date: 2/18/2020  Discharge date and time: 2/23/2020    DISCHARGE DIAGNOSIS:  See below    CONSULTATIONS:  IP CONSULT TO HEMATOLOGY  IP CONSULT TO HOSPITALIST    Excerpted HPI from H&P of Salvador Ramos MD:  The patient is an 81years old woman with past medical history hypertension presented emergency department due to suprapubic pain and low platelets. She reported that she was called by her primary care physician to let her know that her platelet count is 42,967 and she needs to go to the emergency department. Patient denied any blood in the urine, or in the stool, any recent bleeding. She also denied any recent infection, medication, eating outside, chest pain, shortness of breath, palpitation. ______________________________________________________________________  DISCHARGE SUMMARY/HOSPITAL COURSE:  for full details see H&P, daily progress notes, labs, consult notes. Acute thrombocytopenia - resolved   Platelet count 41,431 only   Peripheral blood smear does not have schistocytes as.  ED doctor since he talked with the lab  No anemia, active bleeding  CT head did not reveal intracranial bleed   On aspirin at home, will hold  Hematology was consulted stat from the ER, they do not think this is TTP and recommended fibrinogen, haptoglobin, LDH which was sent  Follow CBC daily  Hold all anticoagulation including DVT prophylaxis     2/19:  Plt remains stable, but still low. Hematology eval on going. Pt denies any current evidence of mucosal bleeding. Continue holding ASA, monitor CBC     2/20:  Pt for BM biopsy this morning  Appreciate Hematology evals  Plt 13k, no indication to transfuse Plt currently, continue holding ASA and monitor CBC    2/21:  Plt improving slowly, now 19k.   Pathology from bone marrow biopsy is pending.    2/22:  Plt count from today is pending  If it continues to rise, will likely dc tomorrow. 2/23:  Plt 119 today - stable for discharge with follow up with Dr. Sanaz Obando      Complicated UTI   Suprapubic pain, pyuria and large leukocyte esterase  Was started on ceftriaxone in the ER, will continue  Follow-up with urine cultures  CT abdomen did not reveal any pyelonephritis or hydronephrosis    2/19:  Continue with IV abx - follow up UCx.    2/20:  UCx was never sent -?why  Complete 7 day course of abx     Acute kidney injury - improving  Likely prerenal from dehydration   On losartan at home, will hold   IV fluids   Follow-up BMP daily     2/19:  Cr improving already, continue with IVF    2/21:  Cr continues to improve     Hypertension  Controlled  -Resume home meds    ____________________________________________________________________  Patient seen and examined by me on discharge day. Pertinent Findings:  Gen:    Not in distress  Chest: Clear lungs  CVS:   Regular rhythm. No edema  Abd:  Soft, not distended, not tender  Neuro:  Alert, oriented x3 no focal deficits  _______________________________________________________________________  DISCHARGE MEDICATIONS:   Current Discharge Medication List      START taking these medications    Details   dexAMETHasone (DECADRON) 4 mg tablet Take 4 mg by mouth Daily (before breakfast). Qty: 1 Tab, Refills: 0      cephALEXin (KEFLEX) 500 mg capsule Take 1 Cap by mouth four (4) times daily for 4 doses. Qty: 4 Cap, Refills: 0         CONTINUE these medications which have NOT CHANGED    Details   losartan (COZAAR) 50 mg tablet Take  by mouth daily. sertraline (ZOLOFT) 50 mg tablet Take  by mouth daily. timolol (TIMOPTIC) 0.5 % ophthalmic solution 1 Drop as needed. nystatin (MYCOSTATIN) powder Apply  to affected area as needed.       oxyCODONE-acetaminophen (PERCOCET) 5-325 mg per tablet Take 1 Tab by mouth every four (4) hours as needed for Pain.      aspirin delayed-release 81 mg tablet Take  by mouth daily. cholecalciferol (VITAMIN D3) 1,000 unit cap Take  by mouth daily. diphenhydrAMINE (BENADRYL) 25 mg tablet Take 25 mg by mouth as needed for Sleep. Patient Follow Up Instructions:    Activity: Activity as tolerated  Diet: Resume previous diet  Wound Care: None    Follow-up Information     Follow up With Specialties Details Why [address], [address] [place] [address], [address] [place] [address]  [address] [place] [id number] [phone]          ________________________________________________________________    Risk of deterioration: Low    Condition at Discharge:  Stable  __________________________________________________________________    Disposition  Home with family, no needs    ____________________________________________________________________    Code Status: DNR  ___________________________________________________________________      Total time in minutes spent coordinating this discharge (includes going over instructions, follow-up, prescriptions, and preparing report for sign off to her PCP) :  35 minutes    Signed:  Alice Espinoza MD

## 2020-02-23 NOTE — PROGRESS NOTES
CM acknowledges discharge order. CM met with pt at discharge re: discharge planning. Pt reports she is ready to return home at this time and is waiting for her children to pick her up. CM offered education re: 2nd IM letter. Pt verbalized understanding and signed 2nd IM letter. Signed copy placed on bedside chart and pt given a copy. Pt reports no further questions or needs at this time. CM will continue to remain available for support, discharge planning as needed.     Concepción Flynn, MSW, LSW  Supervisee in Social Work  [facility]  [phone] diarrhea/abdominal pain/weakness

## 2020-02-25 ENCOUNTER — TELEPHONE (OUTPATIENT)
Dept: ONCOLOGY | Age: 85
End: 2020-02-25

## 2020-02-25 NOTE — TELEPHONE ENCOUNTER
Called pt. No answer. Left detailed VM. Appt. Set up for Friday at 230pm for blood draw and our office at 3pm. Hopefully results will be back during appt. Asked for her to return a call to confirm. Will forward message to schedulers to place pt on [facility] at 230pm for labs. Will send over order to [facility] momentarily.

## 2020-02-27 NOTE — CDMP QUERY
Patient presented to the ER for abnormal labs [place] [address]. Patient was found to have a urinary tract infection. Labs also showed leukocytosis-13K. Patient was treated with IV antibiotics and Decadron. The acute thrombocytopenia was noted to be resolved at the time of discharge-119K. Complicated UTI was documented on the Discharge Summary. After further study, can the condition being treated be further specified as: 
 
?   Sepsis secondary to UTI ( with thrombocytopenia/leukocytosis) ? Immune mediated thrombocytopenia   
? Other_______ ? No further information can be provided Thank you, Kristi Gomez [facility]

## 2020-02-28 ENCOUNTER — HOSPITAL ENCOUNTER (OUTPATIENT)
Dept: INFUSION THERAPY | Age: 85
Discharge: HOME | End: 2020-02-28
Payer: MEDICARE

## 2020-02-28 ENCOUNTER — OFFICE VISIT (OUTPATIENT)
Dept: ONCOLOGY | Age: 85
End: 2020-02-28

## 2020-02-28 VITALS
DIASTOLIC BLOOD PRESSURE: 62 MMHG | HEART RATE: 98 BPM | SYSTOLIC BLOOD PRESSURE: 130 MMHG | BODY MASS INDEX: 32.92 KG/M2 | WEIGHT: 180 LBS | RESPIRATION RATE: 20 BRPM | TEMPERATURE: 97.7 F | OXYGEN SATURATION: 97 %

## 2020-02-28 VITALS
BODY MASS INDEX: 33.13 KG/M2 | HEIGHT: 62 IN | TEMPERATURE: 97.8 F | OXYGEN SATURATION: 93 % | WEIGHT: 180 LBS | DIASTOLIC BLOOD PRESSURE: 84 MMHG | HEART RATE: 98 BPM | SYSTOLIC BLOOD PRESSURE: 168 MMHG

## 2020-02-28 DIAGNOSIS — D69.3 CHRONIC ITP (IDIOPATHIC THROMBOCYTOPENIA) (HCC): Primary | ICD-10-CM

## 2020-02-28 LAB
BASOPHILS # BLD: 0.1 K/UL (ref 0–0.1)
BASOPHILS NFR BLD: 0 % (ref 0–1)
DIFFERENTIAL METHOD BLD: ABNORMAL
EOSINOPHIL # BLD: 0.8 K/UL (ref 0–0.4)
EOSINOPHIL NFR BLD: 4 % (ref 0–7)
ERYTHROCYTE [DISTWIDTH] IN BLOOD BY AUTOMATED COUNT: 14.7 % (ref 11.5–14.5)
HCT VFR BLD AUTO: 41.3 % (ref 35–47)
HGB BLD-MCNC: 13.1 G/DL (ref 11.5–16)
IMM GRANULOCYTES # BLD AUTO: 0.2 K/UL (ref 0–0.04)
IMM GRANULOCYTES NFR BLD AUTO: 1 % (ref 0–0.5)
LYMPHOCYTES # BLD: 3.2 K/UL (ref 0.8–3.5)
LYMPHOCYTES NFR BLD: 16 % (ref 12–49)
MCH RBC QN AUTO: 26.1 PG (ref 26–34)
MCHC RBC AUTO-ENTMCNC: 31.7 G/DL (ref 30–36.5)
MCV RBC AUTO: 82.3 FL (ref 80–99)
MONOCYTES # BLD: 1.2 K/UL (ref 0–1)
MONOCYTES NFR BLD: 6 % (ref 5–13)
NEUTS SEG # BLD: 14.1 K/UL (ref 1.8–8)
NEUTS SEG NFR BLD: 73 % (ref 32–75)
NRBC # BLD: 0 K/UL (ref 0–0.01)
NRBC BLD-RTO: 0 PER 100 WBC
PLATELET # BLD AUTO: 94 K/UL (ref 150–400)
PMV BLD AUTO: 10.7 FL (ref 8.9–12.9)
RBC # BLD AUTO: 5.02 M/UL (ref 3.8–5.2)
WBC # BLD AUTO: 19.5 K/UL (ref 3.6–11)

## 2020-02-28 PROCEDURE — 36415 COLL VENOUS BLD VENIPUNCTURE: CPT

## 2020-02-28 PROCEDURE — 85025 COMPLETE CBC W/AUTO DIFF WBC: CPT

## 2020-02-28 RX ORDER — ALLOPURINOL 100 MG/1
TABLET ORAL DAILY
COMMUNITY

## 2020-02-28 RX ORDER — KETOCONAZOLE 20 MG/G
CREAM TOPICAL DAILY
COMMUNITY

## 2020-02-28 RX ORDER — MICONAZOLE NITRATE 2 %
POWDER (GRAM) TOPICAL 2 TIMES DAILY
COMMUNITY

## 2020-02-28 RX ORDER — TRAZODONE HYDROCHLORIDE 50 MG/1
TABLET ORAL
COMMUNITY

## 2020-02-28 RX ORDER — SIMVASTATIN 20 MG/1
TABLET, FILM COATED ORAL
COMMUNITY

## 2020-02-28 NOTE — PROGRESS NOTES
Jitendar Hatch is a 81 y.o. female here for new patient appt for:  Chief Complaint   Patient presents with    Thrombocytopenia   follow up from hospitalization    1. Have you been to the ER, urgent care clinic since your last visit? Hospitalized since your last visit? no    2. Have you seen or consulted any other health care providers outside of the [address] [place] [address] since your last visit? Include any pap smears or colon screening. No    Pt here with son.

## 2020-02-28 NOTE — PROGRESS NOTES
[facility] Lab Draw Note:  Arrived - 4900    Visit Vitals  /62 (BP 1 Location: Left arm, BP Patient Position: Sitting)   Pulse 98   Temp 97.7 °F (36.5 °C)   Resp 20   Wt 81.6 kg (180 lb)   SpO2 97%   BMI 32.92 kg/m²       Labs drawn peripherally from L fore arm - nurse assist  Pt left at 1500 - Tolerated well. Pt denies any acute problems/changes. Discharged from [facility]. See [facility] care for pending lab results.

## 2020-02-28 NOTE — PATIENT INSTRUCTIONS
You have an appointment at the out patient infusion center on Friday March 13, 2020 at 2 pm. Anabell Segura will see us at 2:45 pm.

## 2020-02-29 NOTE — PROGRESS NOTES
[facility]  at [facility]  [address] [facility], [address] [place] [address], [address] [facility]  [phone]         Hematology Follow up        Patient: Cristiana Whitaker MRN: [id number]  SSN: [id number]    YOB: 1935  Age: 81 y.o. Sex: female        Subjective:      Cristiana Whitaker is a 81 y.o. female who I am seeing in follow up for cITP. She underwent routine laboratory studies with PCP. PCP called her back with the results. She was asked to head to the hospital due to severe thrombocytopenia. She had experienced nose bleed, bleeding from the mouth and small petechial hemorrhages. A bone marrow biopsy was performed which was normal. She received high dose Dexamethasone for 4 days for the treatment of cITP. Platelet count initially improved. However today it is lower. She denies bleeding,         Review of Systems:    Constitutional: negative  Eyes: negative  Ears, Nose, Mouth, Throat, and Face: negative  Respiratory: negative  Cardiovascular: negative  Gastrointestinal: negative  Genitourinary:negative  Integument/Breast: petechaie  Hematologic/Lymphatic: negative  Musculoskeletal:negative  Neurological: negative      Past Medical History:   Diagnosis Date    Arthritis     Diabetes ([address]     Hypertension      Past Surgical History:   Procedure Laterality Date    HX ORTHOPAEDIC        Family History   Problem Relation Age of Onset    Parkinson's Disease Neg Hx     Seizures Neg Hx     Huntingtons disease Neg Hx      Social History     Tobacco Use    Smoking status: Never Smoker    Smokeless tobacco: Never Used   Substance Use Topics    Alcohol use: No      Prior to Admission medications    Medication Sig Start Date End Date Taking? Authorizing Provider   alpa JACOME [facility] ORIGINAL) 0.5-0.5 % lotion Apply  to affected area as needed for Itching.    Yes Provider, Historical   ketoconazole (NIZORAL) 2 % topical cream Apply  to affected area daily. Yes Provider, Historical   zinc oxide (BOUDREAUXS BUTT PASTE) 16 % oint ointment Apply  to affected area as needed for Skin Irritation. Yes Provider, Historical   miconazole (ZEASORB AF) 2 % topical powder Apply  to affected area two (2) times a day. Yes Provider, Historical   traZODone (DESYREL) 50 mg tablet Take  by mouth nightly. Yes Provider, Historical   allopurinoL (ZYLOPRIM) 100 mg tablet Take  by mouth daily. Yes Provider, Historical   simvastatin (ZOCOR) 20 mg tablet Take  by mouth nightly. Yes Provider, Historical   losartan (COZAAR) 50 mg tablet Take  by mouth daily. Yes Provider, Historical   aspirin delayed-release 81 mg tablet Take  by mouth daily. Yes Provider, Historical   cholecalciferol (VITAMIN D3) 1,000 unit cap Take  by mouth daily. Yes Provider, Historical   diphenhydrAMINE (BENADRYL) 25 mg tablet Take 25 mg by mouth as needed for Sleep. Yes Provider, Historical   dexAMETHasone (DECADRON) 4 mg tablet Take 4 mg by mouth Daily (before breakfast). 2/24/20   Ajit Ledesma MD   sertraline (ZOLOFT) 50 mg tablet Take  by mouth daily. Provider, Historical   timolol (TIMOPTIC) 0.5 % ophthalmic solution 1 Drop as needed. Provider, Historical   nystatin (MYCOSTATIN) powder Apply  to affected area as needed. Provider, Historical   oxyCODONE-acetaminophen (PERCOCET) 5-325 mg per tablet Take 1 Tab by mouth every four (4) hours as needed for Pain. Provider, Historical              Allergies   Allergen Reactions    Sulfa (Sulfonamide Antibiotics) Hives           Objective:     Vitals:    02/28/20 1526   BP: 168/84   Pulse: 98   Temp: 97.8 °F (36.6 °C)   TempSrc: Oral   SpO2: 93%   Weight: 180 lb (81.6 kg)   Height: 5' 2\" (1.575 m)            Physical Exam:    GENERAL: alert, cooperative, no distress, appears stated age  EYE: conjunctivae/corneas clear. PERRL, EOM's intact.   LYMPHATIC: Cervical, supraclavicular, and axillary nodes normal.   THROAT & NECK: normal and no erythema or exudates noted. LUNG: clear to auscultation bilaterally  HEART: regular rate and rhythm, S1, S2 normal, no murmur, click, rub or gallop  ABDOMEN: soft, non-tender. Bowel sounds normal. No masses,  no organomegaly  EXTREMITIES:  extremities normal, atraumatic, no cyanosis or edema  SKIN: petechaie on the legs  NEUROLOGIC: AOx3. Gait normal. Reflexes and motor strength normal and symmetric. Cranial nerves 2-12 and sensation grossly intact. LABS:     Lab Results   Component Value Date/Time    WBC 19.5 (H) 02/28/2020 02:54 PM    HGB 13.1 02/28/2020 02:54 PM    HCT 41.3 02/28/2020 02:54 PM    PLATELET 94 (L) 62/67/2329 02:54 PM    MCV 82.3 02/28/2020 02:54 PM            Assessment:     1. Chronic ITP    Bone marrow Bx - normal  Recently completed high dose Dex for 4 days  Platelet count has been rising slowly. However today it has dropped a little  Will follow it closely. Plan:       1. CBC in 2 weeks and a follow up visit in the clinic      Signed by: Hui Ortega MD                     February 29, 2020       CC.  Josiah Palmer MD

## 2021-02-22 ENCOUNTER — APPOINTMENT (OUTPATIENT)
Age: 86
Setting detail: DERMATOLOGY
End: 2021-02-25

## 2021-02-22 DIAGNOSIS — D49.2 NEOPLASM OF UNSPECIFIED BEHAVIOR OF BONE, SOFT TISSUE, AND SKIN: ICD-10-CM

## 2021-02-22 DIAGNOSIS — L57.8 OTHER SKIN CHANGES DUE TO CHRONIC EXPOSURE TO NONIONIZING RADIATION: ICD-10-CM

## 2021-02-22 DIAGNOSIS — L73.8 OTHER SPECIFIED FOLLICULAR DISORDERS: ICD-10-CM

## 2021-02-22 PROBLEM — D23.5 OTHER BENIGN NEOPLASM OF SKIN OF TRUNK: Status: ACTIVE | Noted: 2021-02-22

## 2021-02-22 PROBLEM — D23.39 OTHER BENIGN NEOPLASM OF SKIN OF OTHER PARTS OF FACE: Status: ACTIVE | Noted: 2021-02-22

## 2021-02-22 PROCEDURE — 99213 OFFICE O/P EST LOW 20 MIN: CPT | Mod: 25

## 2021-02-22 PROCEDURE — 11102 TANGNTL BX SKIN SINGLE LES: CPT

## 2021-02-22 PROCEDURE — OTHER MIPS QUALITY: OTHER

## 2021-02-22 PROCEDURE — OTHER BIOPSY BY SHAVE METHOD: OTHER

## 2021-02-22 PROCEDURE — OTHER REASSURANCE: OTHER

## 2021-02-22 PROCEDURE — OTHER COUNSELING: OTHER

## 2021-02-22 ASSESSMENT — LOCATION DETAILED DESCRIPTION DERM
LOCATION DETAILED: SUPERIOR MID FOREHEAD
LOCATION DETAILED: RIGHT ANTECUBITAL SKIN

## 2021-02-22 ASSESSMENT — LOCATION ZONE DERM
LOCATION ZONE: FACE
LOCATION ZONE: ARM

## 2021-02-22 ASSESSMENT — LOCATION SIMPLE DESCRIPTION DERM
LOCATION SIMPLE: RIGHT UPPER ARM
LOCATION SIMPLE: SUPERIOR FOREHEAD

## 2021-02-22 NOTE — PROCEDURE: BIOPSY BY SHAVE METHOD
Biopsy Type: H and E
Silver Nitrate Text: The wound bed was treated with silver nitrate after the biopsy was performed.
Hemostasis: Drysol
Bill 19607 For Specimen Handling/Conveyance To Laboratory?: no
X Size Of Lesion In Cm: 0
Consent: Written consent was obtained and risks were reviewed including but not limited to scarring, infection, bleeding, scabbing, incomplete removal, nerve damage and allergy to anesthesia.
Information: Selecting Yes will display possible errors in your note based on the variables you have selected. This validation is only offered as a suggestion for you. PLEASE NOTE THAT THE VALIDATION TEXT WILL BE REMOVED WHEN YOU FINALIZE YOUR NOTE. IF YOU WANT TO FAX A PRELIMINARY NOTE YOU WILL NEED TO TOGGLE THIS TO 'NO' IF YOU DO NOT WANT IT IN YOUR FAXED NOTE.
Detail Level: Detailed
Electrodesiccation And Curettage Text: The wound bed was treated with electrodesiccation and curettage after the biopsy was performed.
Notification Instructions: Patient will be notified of biopsy results. However, patient instructed to call the office if not contacted within 2 weeks.
Anesthesia Type: 1% lidocaine with epinephrine
Electrodesiccation Text: The wound bed was treated with electrodesiccation after the biopsy was performed.
Wound Care: Petrolatum
Post-Care Instructions: I reviewed with the patient in detail post-care instructions. Patient is to keep the biopsy site dry overnight, and then apply bacitracin twice daily until healed. Patient may apply hydrogen peroxide soaks to remove any crusting.
Type Of Destruction Used: Curettage
Biopsy Method: Dermablade
Cryotherapy Text: The wound bed was treated with cryotherapy after the biopsy was performed.
Depth Of Biopsy: dermis
Dressing: bandage
Anesthesia Volume In Cc (Will Not Render If 0): 0.5
Curettage Text: The wound bed was treated with curettage after the biopsy was performed.
Billing Type: Third-Party Bill
Was A Bandage Applied: Yes

## 2022-03-19 PROBLEM — G93.40 ACUTE ENCEPHALOPATHY: Status: ACTIVE | Noted: 2020-02-18

## 2022-03-19 PROBLEM — D69.6 THROMBOCYTOPENIA (HCC): Status: ACTIVE | Noted: 2020-02-18

## 2022-03-19 PROBLEM — N39.0 COMPLICATED UTI (URINARY TRACT INFECTION): Status: ACTIVE | Noted: 2020-02-18

## 2022-03-19 PROBLEM — A41.9 SEPSIS (HCC): Status: ACTIVE | Noted: 2020-02-18

## 2022-03-19 PROBLEM — D69.3 CHRONIC ITP (IDIOPATHIC THROMBOCYTOPENIA) (HCC): Status: ACTIVE | Noted: 2020-02-21

## 2023-05-23 RX ORDER — ASPIRIN 81 MG/1
TABLET ORAL DAILY
COMMUNITY

## 2023-05-23 RX ORDER — TIMOLOL MALEATE 5 MG/ML
1 SOLUTION/ DROPS OPHTHALMIC PRN
COMMUNITY

## 2023-05-23 RX ORDER — KETOCONAZOLE 20 MG/G
CREAM TOPICAL DAILY
COMMUNITY

## 2023-05-23 RX ORDER — DEXAMETHASONE 4 MG/1
4 TABLET ORAL
COMMUNITY
Start: 2020-02-24

## 2023-05-23 RX ORDER — SIMVASTATIN 20 MG
TABLET ORAL
COMMUNITY

## 2023-05-23 RX ORDER — LOSARTAN POTASSIUM 50 MG/1
TABLET ORAL DAILY
COMMUNITY

## 2023-05-23 RX ORDER — OXYCODONE HYDROCHLORIDE AND ACETAMINOPHEN 5; 325 MG/1; MG/1
1 TABLET ORAL EVERY 4 HOURS PRN
COMMUNITY

## 2023-05-23 RX ORDER — ALLOPURINOL 100 MG/1
TABLET ORAL DAILY
COMMUNITY

## 2023-05-23 RX ORDER — TRAZODONE HYDROCHLORIDE 50 MG/1
TABLET ORAL
COMMUNITY

## 2023-05-23 RX ORDER — NYSTATIN 100000 [USP'U]/G
POWDER TOPICAL PRN
COMMUNITY